# Patient Record
Sex: MALE | Race: WHITE | NOT HISPANIC OR LATINO
[De-identification: names, ages, dates, MRNs, and addresses within clinical notes are randomized per-mention and may not be internally consistent; named-entity substitution may affect disease eponyms.]

---

## 2017-06-06 ENCOUNTER — RECORD ABSTRACTING (OUTPATIENT)
Age: 77
End: 2017-06-06

## 2017-06-06 DIAGNOSIS — Z86.19 PERSONAL HISTORY OF OTHER INFECTIOUS AND PARASITIC DISEASES: ICD-10-CM

## 2017-06-06 DIAGNOSIS — Z82.3 FAMILY HISTORY OF STROKE: ICD-10-CM

## 2017-06-06 DIAGNOSIS — Z80.1 FAMILY HISTORY OF MALIGNANT NEOPLASM OF TRACHEA, BRONCHUS AND LUNG: ICD-10-CM

## 2017-06-06 DIAGNOSIS — Z82.49 FAMILY HISTORY OF ISCHEMIC HEART DISEASE AND OTHER DISEASES OF THE CIRCULATORY SYSTEM: ICD-10-CM

## 2017-06-06 DIAGNOSIS — Z92.3 PERSONAL HISTORY OF IRRADIATION: ICD-10-CM

## 2017-06-06 DIAGNOSIS — F15.90 OTHER STIMULANT USE, UNSPECIFIED, UNCOMPLICATED: ICD-10-CM

## 2017-06-06 DIAGNOSIS — Z87.09 PERSONAL HISTORY OF OTHER DISEASES OF THE RESPIRATORY SYSTEM: ICD-10-CM

## 2017-06-06 DIAGNOSIS — L03.90 CELLULITIS, UNSPECIFIED: ICD-10-CM

## 2017-06-06 DIAGNOSIS — J39.9 DISEASE OF UPPER RESPIRATORY TRACT, UNSPECIFIED: ICD-10-CM

## 2017-06-06 DIAGNOSIS — L82.1 OTHER SEBORRHEIC KERATOSIS: ICD-10-CM

## 2017-06-06 DIAGNOSIS — Z86.79 PERSONAL HISTORY OF OTHER DISEASES OF THE CIRCULATORY SYSTEM: ICD-10-CM

## 2017-06-06 DIAGNOSIS — Z87.891 PERSONAL HISTORY OF NICOTINE DEPENDENCE: ICD-10-CM

## 2017-07-24 ENCOUNTER — APPOINTMENT (OUTPATIENT)
Dept: HEART AND VASCULAR | Facility: CLINIC | Age: 77
End: 2017-07-24

## 2017-07-24 VITALS
TEMPERATURE: 97.6 F | HEART RATE: 54 BPM | OXYGEN SATURATION: 97 % | BODY MASS INDEX: 24.89 KG/M2 | DIASTOLIC BLOOD PRESSURE: 60 MMHG | WEIGHT: 147.58 LBS | SYSTOLIC BLOOD PRESSURE: 134 MMHG | HEIGHT: 64.57 IN

## 2017-07-24 DIAGNOSIS — Z12.9 ENCOUNTER FOR SCREENING FOR MALIGNANT NEOPLASM, SITE UNSPECIFIED: ICD-10-CM

## 2017-07-24 RX ORDER — ASPIRIN ENTERIC COATED TABLETS 81 MG 81 MG/1
81 TABLET, DELAYED RELEASE ORAL
Refills: 0 | Status: ACTIVE | COMMUNITY

## 2017-07-25 LAB
ALBUMIN SERPL ELPH-MCNC: 3.9 G/DL
ALP BLD-CCNC: 70 U/L
ALT SERPL-CCNC: 14 U/L
ANION GAP SERPL CALC-SCNC: 14 MMOL/L
AST SERPL-CCNC: 17 U/L
BASOPHILS # BLD AUTO: 0.03 K/UL
BASOPHILS NFR BLD AUTO: 0.5 %
BILIRUB SERPL-MCNC: 0.6 MG/DL
BUN SERPL-MCNC: 26 MG/DL
CALCIUM SERPL-MCNC: 9.2 MG/DL
CHLORIDE SERPL-SCNC: 104 MMOL/L
CHOLEST SERPL-MCNC: 119 MG/DL
CHOLEST/HDLC SERPL: 2.4 RATIO
CO2 SERPL-SCNC: 22 MMOL/L
CREAT SERPL-MCNC: 0.99 MG/DL
EOSINOPHIL # BLD AUTO: 0.29 K/UL
EOSINOPHIL NFR BLD AUTO: 5.2 %
GLUCOSE SERPL-MCNC: 93 MG/DL
HBA1C MFR BLD HPLC: 5.9 %
HCT VFR BLD CALC: 42.1 %
HDLC SERPL-MCNC: 49 MG/DL
HGB BLD-MCNC: 13.6 G/DL
IMM GRANULOCYTES NFR BLD AUTO: 0.4 %
LDLC SERPL CALC-MCNC: 54 MG/DL
LYMPHOCYTES # BLD AUTO: 1.38 K/UL
LYMPHOCYTES NFR BLD AUTO: 24.6 %
MAN DIFF?: NORMAL
MCHC RBC-ENTMCNC: 30.1 PG
MCHC RBC-ENTMCNC: 32.3 GM/DL
MCV RBC AUTO: 93.1 FL
MONOCYTES # BLD AUTO: 0.69 K/UL
MONOCYTES NFR BLD AUTO: 12.3 %
NEUTROPHILS # BLD AUTO: 3.2 K/UL
NEUTROPHILS NFR BLD AUTO: 57 %
PLATELET # BLD AUTO: 201 K/UL
POTASSIUM SERPL-SCNC: 4.7 MMOL/L
PROT SERPL-MCNC: 6.5 G/DL
RBC # BLD: 4.52 M/UL
RBC # FLD: 13.3 %
SODIUM SERPL-SCNC: 140 MMOL/L
TRIGL SERPL-MCNC: 78 MG/DL
WBC # FLD AUTO: 5.61 K/UL

## 2017-10-25 ENCOUNTER — RX RENEWAL (OUTPATIENT)
Age: 77
End: 2017-10-25

## 2017-12-07 ENCOUNTER — RX RENEWAL (OUTPATIENT)
Age: 77
End: 2017-12-07

## 2017-12-30 ENCOUNTER — RX RENEWAL (OUTPATIENT)
Age: 77
End: 2017-12-30

## 2018-01-23 ENCOUNTER — APPOINTMENT (OUTPATIENT)
Dept: HEART AND VASCULAR | Facility: CLINIC | Age: 78
End: 2018-01-23
Payer: MEDICARE

## 2018-01-23 VITALS
TEMPERATURE: 98 F | BODY MASS INDEX: 24.96 KG/M2 | HEART RATE: 59 BPM | SYSTOLIC BLOOD PRESSURE: 130 MMHG | HEIGHT: 64.57 IN | DIASTOLIC BLOOD PRESSURE: 70 MMHG | OXYGEN SATURATION: 96 % | WEIGHT: 148 LBS

## 2018-01-23 PROCEDURE — 99214 OFFICE O/P EST MOD 30 MIN: CPT | Mod: 25

## 2018-01-23 PROCEDURE — 93000 ELECTROCARDIOGRAM COMPLETE: CPT

## 2018-02-02 ENCOUNTER — RX RENEWAL (OUTPATIENT)
Age: 78
End: 2018-02-02

## 2018-07-24 ENCOUNTER — APPOINTMENT (OUTPATIENT)
Dept: HEART AND VASCULAR | Facility: CLINIC | Age: 78
End: 2018-07-24
Payer: MEDICARE

## 2018-07-24 VITALS
SYSTOLIC BLOOD PRESSURE: 120 MMHG | DIASTOLIC BLOOD PRESSURE: 60 MMHG | OXYGEN SATURATION: 98 % | TEMPERATURE: 97.6 F | WEIGHT: 149 LBS | HEART RATE: 61 BPM | BODY MASS INDEX: 25.13 KG/M2 | HEIGHT: 64.57 IN

## 2018-07-24 PROCEDURE — 99214 OFFICE O/P EST MOD 30 MIN: CPT | Mod: 25

## 2018-07-24 PROCEDURE — 36415 COLL VENOUS BLD VENIPUNCTURE: CPT

## 2018-07-24 RX ORDER — CLOPIDOGREL 75 MG/1
75 TABLET, FILM COATED ORAL DAILY
Refills: 0 | Status: DISCONTINUED | COMMUNITY
End: 2018-07-24

## 2018-07-26 LAB
ALBUMIN SERPL ELPH-MCNC: 4.3 G/DL
ALP BLD-CCNC: 78 U/L
ALT SERPL-CCNC: 11 U/L
ANION GAP SERPL CALC-SCNC: 10 MMOL/L
AST SERPL-CCNC: 15 U/L
BASOPHILS # BLD AUTO: 0.03 K/UL
BASOPHILS NFR BLD AUTO: 0.5 %
BILIRUB SERPL-MCNC: 0.6 MG/DL
BUN SERPL-MCNC: 25 MG/DL
CALCIUM SERPL-MCNC: 9.3 MG/DL
CHLORIDE SERPL-SCNC: 106 MMOL/L
CHOLEST SERPL-MCNC: 119 MG/DL
CHOLEST/HDLC SERPL: 2.6 RATIO
CO2 SERPL-SCNC: 27 MMOL/L
CREAT SERPL-MCNC: 1.08 MG/DL
EOSINOPHIL # BLD AUTO: 0.25 K/UL
EOSINOPHIL NFR BLD AUTO: 4.1 %
GLUCOSE SERPL-MCNC: 92 MG/DL
HBA1C MFR BLD HPLC: 6 %
HCT VFR BLD CALC: 43 %
HDLC SERPL-MCNC: 45 MG/DL
HGB BLD-MCNC: 13.2 G/DL
IMM GRANULOCYTES NFR BLD AUTO: 0.3 %
LDLC SERPL CALC-MCNC: 57 MG/DL
LYMPHOCYTES # BLD AUTO: 1.66 K/UL
LYMPHOCYTES NFR BLD AUTO: 27.4 %
MAN DIFF?: NORMAL
MCHC RBC-ENTMCNC: 29.4 PG
MCHC RBC-ENTMCNC: 30.7 GM/DL
MCV RBC AUTO: 95.8 FL
MONOCYTES # BLD AUTO: 0.62 K/UL
MONOCYTES NFR BLD AUTO: 10.2 %
NEUTROPHILS # BLD AUTO: 3.48 K/UL
NEUTROPHILS NFR BLD AUTO: 57.5 %
PLATELET # BLD AUTO: 217 K/UL
POTASSIUM SERPL-SCNC: 4.7 MMOL/L
PROT SERPL-MCNC: 6.6 G/DL
PSA SERPL-MCNC: <0.01 NG/ML
RBC # BLD: 4.49 M/UL
RBC # FLD: 13.2 %
SODIUM SERPL-SCNC: 143 MMOL/L
TRIGL SERPL-MCNC: 87 MG/DL
WBC # FLD AUTO: 6.06 K/UL

## 2018-09-17 ENCOUNTER — RX RENEWAL (OUTPATIENT)
Age: 78
End: 2018-09-17

## 2018-11-26 ENCOUNTER — RX RENEWAL (OUTPATIENT)
Age: 78
End: 2018-11-26

## 2019-01-22 ENCOUNTER — RX RENEWAL (OUTPATIENT)
Age: 79
End: 2019-01-22

## 2019-01-22 ENCOUNTER — APPOINTMENT (OUTPATIENT)
Dept: HEART AND VASCULAR | Facility: CLINIC | Age: 79
End: 2019-01-22
Payer: MEDICARE

## 2019-01-22 VITALS
WEIGHT: 145.99 LBS | DIASTOLIC BLOOD PRESSURE: 60 MMHG | HEART RATE: 75 BPM | HEIGHT: 64.57 IN | OXYGEN SATURATION: 96 % | SYSTOLIC BLOOD PRESSURE: 130 MMHG | BODY MASS INDEX: 24.62 KG/M2 | TEMPERATURE: 97.4 F

## 2019-01-22 PROCEDURE — 93000 ELECTROCARDIOGRAM COMPLETE: CPT

## 2019-01-22 PROCEDURE — 99214 OFFICE O/P EST MOD 30 MIN: CPT

## 2019-01-22 NOTE — PHYSICAL EXAM
[General Appearance - Well Developed] : well developed [Normal Appearance] : normal appearance [Well Groomed] : well groomed [General Appearance - Well Nourished] : well nourished [No Deformities] : no deformities [General Appearance - In No Acute Distress] : no acute distress [Normal Conjunctiva] : the conjunctiva exhibited no abnormalities [Eyelids - No Xanthelasma] : the eyelids demonstrated no xanthelasmas [Normal Oral Mucosa] : normal oral mucosa [No Oral Pallor] : no oral pallor [No Oral Cyanosis] : no oral cyanosis [Respiration, Rhythm And Depth] : normal respiratory rhythm and effort [Exaggerated Use Of Accessory Muscles For Inspiration] : no accessory muscle use [Auscultation Breath Sounds / Voice Sounds] : lungs were clear to auscultation bilaterally [Heart Rate And Rhythm] : heart rate and rhythm were normal [Heart Sounds] : normal S1 and S2 [Murmurs] : no murmurs present [Abdomen Soft] : soft [Abdomen Tenderness] : non-tender [Abdomen Mass (___ Cm)] : no abdominal mass palpated [Abnormal Walk] : normal gait [Gait - Sufficient For Exercise Testing] : the gait was sufficient for exercise testing [Nail Clubbing] : no clubbing of the fingernails [Cyanosis, Localized] : no localized cyanosis [Petechial Hemorrhages (___cm)] : no petechial hemorrhages [Skin Color & Pigmentation] : normal skin color and pigmentation [] : no rash [No Venous Stasis] : no venous stasis [No Skin Ulcers] : no skin ulcer [No Xanthoma] : no  xanthoma was observed [Oriented To Time, Place, And Person] : oriented to person, place, and time [Affect] : the affect was normal [Mood] : the mood was normal [No Anxiety] : not feeling anxious [FreeTextEntry1] : keratoses

## 2019-01-22 NOTE — REASON FOR VISIT
[Follow-Up - Clinic] : a clinic follow-up of [Coronary Artery Disease] : coronary artery disease [Hyperlipidemia] : hyperlipidemia [Hypertension] : hypertension [FreeTextEntry1] : s/p multiple stents, last one 11/3/16(pLAD) after presenting with CP and a very abnormal nuclear stress test.  Currently pain free. Walks a lot even in cold weather\par \par EKG: NSR, normal axis and intervals, no ST-Tw abnormalities. 1/22/19

## 2019-01-22 NOTE — HISTORY OF PRESENT ILLNESS
[FreeTextEntry1] : - Dr Quiroga in [place] retired, \par Derm- Dr Navin Muniz in [place]  [phone]

## 2019-01-22 NOTE — ASSESSMENT
[FreeTextEntry1] : ASHD- s/p multiple stents, pain free, walks 2-3 miles/day.  OK in cold weather\par \par HLD- Lipids at goal 11/2016,   rechecked and excellent 7/2018\par \par HTN- excellent\par \par Keratoses- handed a referrel to Ronald, lives in [place], he will see someone in [place]. Has not gone as of 1/23/18, finally he went to Dr Navin Muniz,had several skin cancers removed from face and scalp.\par \par h/o Prostate CA- needs f/u with Urology, Dr Quiroga retired,  Told to see a new Urologist, PSA sent. Zero\par \par Prediabetes- A1c 5.9, diet better

## 2019-02-04 ENCOUNTER — RX RENEWAL (OUTPATIENT)
Age: 79
End: 2019-02-04

## 2019-02-21 ENCOUNTER — RX RENEWAL (OUTPATIENT)
Age: 79
End: 2019-02-21

## 2019-07-22 ENCOUNTER — APPOINTMENT (OUTPATIENT)
Dept: HEART AND VASCULAR | Facility: CLINIC | Age: 79
End: 2019-07-22
Payer: MEDICARE

## 2019-07-22 VITALS
WEIGHT: 145 LBS | BODY MASS INDEX: 24.75 KG/M2 | SYSTOLIC BLOOD PRESSURE: 117 MMHG | HEART RATE: 75 BPM | TEMPERATURE: 98 F | DIASTOLIC BLOOD PRESSURE: 60 MMHG | HEIGHT: 64 IN | OXYGEN SATURATION: 98 %

## 2019-07-22 PROCEDURE — 99214 OFFICE O/P EST MOD 30 MIN: CPT

## 2019-07-22 PROCEDURE — 36415 COLL VENOUS BLD VENIPUNCTURE: CPT

## 2019-07-22 NOTE — PHYSICAL EXAM
[Normal Appearance] : normal appearance [General Appearance - Well Developed] : well developed [Well Groomed] : well groomed [General Appearance - Well Nourished] : well nourished [No Deformities] : no deformities [General Appearance - In No Acute Distress] : no acute distress [Normal Conjunctiva] : the conjunctiva exhibited no abnormalities [Eyelids - No Xanthelasma] : the eyelids demonstrated no xanthelasmas [Normal Oral Mucosa] : normal oral mucosa [No Oral Pallor] : no oral pallor [No Oral Cyanosis] : no oral cyanosis [Respiration, Rhythm And Depth] : normal respiratory rhythm and effort [Exaggerated Use Of Accessory Muscles For Inspiration] : no accessory muscle use [Auscultation Breath Sounds / Voice Sounds] : lungs were clear to auscultation bilaterally [Heart Rate And Rhythm] : heart rate and rhythm were normal [Heart Sounds] : normal S1 and S2 [Murmurs] : no murmurs present [Abdomen Soft] : soft [Abdomen Tenderness] : non-tender [Abnormal Walk] : normal gait [Abdomen Mass (___ Cm)] : no abdominal mass palpated [Gait - Sufficient For Exercise Testing] : the gait was sufficient for exercise testing [Nail Clubbing] : no clubbing of the fingernails [Cyanosis, Localized] : no localized cyanosis [Petechial Hemorrhages (___cm)] : no petechial hemorrhages [Skin Color & Pigmentation] : normal skin color and pigmentation [] : no rash [No Venous Stasis] : no venous stasis [No Skin Ulcers] : no skin ulcer [No Xanthoma] : no  xanthoma was observed [Oriented To Time, Place, And Person] : oriented to person, place, and time [Affect] : the affect was normal [Mood] : the mood was normal [No Anxiety] : not feeling anxious [FreeTextEntry1] : keratoses

## 2019-07-22 NOTE — HISTORY OF PRESENT ILLNESS
[FreeTextEntry1] : - Dr Quiroga in [place] retired, \par Derm- Dr Navin Muniz in [place]  [phone]

## 2019-07-22 NOTE — ASSESSMENT
[FreeTextEntry1] : ASHD- s/p multiple stents, pain free, walks 2-3 miles/day.  OK in cold weather\par \par HLD- Lipids at goal 11/2016,   rechecked and excellent 7/2018, repeated 7/2019\par \par HTN- excellent\par \par Keratoses- handed a referrel to Ronald, lives in [place], he will see someone in [place]. Has not gone as of 1/23/18, finally he went to Dr Navin Muniz,had several skin cancers removed from face and scalp.\par \par h/o Prostate CA- needs f/u with Urology, Dr Quiroga retired,  Told to see a new Urologist, PSA sent. Zero\par \par Prediabetes- A1c 5.9, diet better

## 2019-07-24 LAB
ALBUMIN SERPL ELPH-MCNC: 4.3 G/DL
ALP BLD-CCNC: 74 U/L
ALT SERPL-CCNC: 12 U/L
ANION GAP SERPL CALC-SCNC: 12 MMOL/L
AST SERPL-CCNC: 14 U/L
BASOPHILS # BLD AUTO: 0.05 K/UL
BASOPHILS NFR BLD AUTO: 0.7 %
BILIRUB SERPL-MCNC: 0.5 MG/DL
BUN SERPL-MCNC: 25 MG/DL
CALCIUM SERPL-MCNC: 9.5 MG/DL
CHLORIDE SERPL-SCNC: 109 MMOL/L
CHOLEST SERPL-MCNC: 124 MG/DL
CHOLEST/HDLC SERPL: 2.5 RATIO
CO2 SERPL-SCNC: 23 MMOL/L
CREAT SERPL-MCNC: 1.06 MG/DL
EOSINOPHIL # BLD AUTO: 0.27 K/UL
EOSINOPHIL NFR BLD AUTO: 3.8 %
ESTIMATED AVERAGE GLUCOSE: 120 MG/DL
GLUCOSE SERPL-MCNC: 106 MG/DL
HBA1C MFR BLD HPLC: 5.8 %
HCT VFR BLD CALC: 46.1 %
HDLC SERPL-MCNC: 49 MG/DL
HGB BLD-MCNC: 13.9 G/DL
IMM GRANULOCYTES NFR BLD AUTO: 0.6 %
LDLC SERPL CALC-MCNC: 56 MG/DL
LYMPHOCYTES # BLD AUTO: 1.39 K/UL
LYMPHOCYTES NFR BLD AUTO: 19.8 %
MAN DIFF?: NORMAL
MCHC RBC-ENTMCNC: 30.2 GM/DL
MCHC RBC-ENTMCNC: 30.2 PG
MCV RBC AUTO: 100.2 FL
MONOCYTES # BLD AUTO: 0.82 K/UL
MONOCYTES NFR BLD AUTO: 11.7 %
NEUTROPHILS # BLD AUTO: 4.46 K/UL
NEUTROPHILS NFR BLD AUTO: 63.4 %
PLATELET # BLD AUTO: 202 K/UL
POTASSIUM SERPL-SCNC: 4.8 MMOL/L
PROT SERPL-MCNC: 6.2 G/DL
RBC # BLD: 4.6 M/UL
RBC # FLD: 13 %
SODIUM SERPL-SCNC: 144 MMOL/L
TRIGL SERPL-MCNC: 97 MG/DL
WBC # FLD AUTO: 7.03 K/UL

## 2019-08-22 ENCOUNTER — RX RENEWAL (OUTPATIENT)
Age: 79
End: 2019-08-22

## 2019-11-06 ENCOUNTER — APPOINTMENT (OUTPATIENT)
Dept: HEART AND VASCULAR | Facility: CLINIC | Age: 79
End: 2019-11-06

## 2020-01-27 ENCOUNTER — APPOINTMENT (OUTPATIENT)
Dept: HEART AND VASCULAR | Facility: CLINIC | Age: 80
End: 2020-01-27
Payer: MEDICARE

## 2020-01-27 VITALS
WEIGHT: 145 LBS | OXYGEN SATURATION: 98 % | SYSTOLIC BLOOD PRESSURE: 118 MMHG | HEIGHT: 64 IN | DIASTOLIC BLOOD PRESSURE: 64 MMHG | HEART RATE: 76 BPM | BODY MASS INDEX: 24.75 KG/M2

## 2020-01-27 PROCEDURE — 99214 OFFICE O/P EST MOD 30 MIN: CPT

## 2020-01-27 PROCEDURE — 93000 ELECTROCARDIOGRAM COMPLETE: CPT

## 2020-01-27 NOTE — REASON FOR VISIT
[Follow-Up - Clinic] : a clinic follow-up of [Coronary Artery Disease] : coronary artery disease [Hyperlipidemia] : hyperlipidemia [Hypertension] : hypertension [FreeTextEntry1] : s/p multiple stents, last one 11/3/16(pLAD) after presenting with CP and a very abnormal nuclear stress test.  Currently pain free. Walks a lot even in cold weather\par 1/27/20  Dx with DJD of right hip and osteopenia\par EKG: NSR, normal axis and intervals, no ST-Tw abnormalities. 1/22/19

## 2020-01-27 NOTE — ASSESSMENT
[FreeTextEntry1] : ASHD- s/p multiple stents, pain free, walks 2-3 miles/day.  OK in cold weather, still doing well\par \par HLD- Lipids at goal 11/2016,   rechecked and excellent 7/2018, repeated 7/2019, excellent\par \par HTN- excellent\par \par Keratoses- handed a referrel to Derm, lives in [place], he will see someone in [place]. Has not gone as of 1/23/18, finally he went to Dr Navin Muniz,had several skin cancers removed from face and scalp.\par \par h/o Prostate CA- needs f/u with Urology, Dr Quiroga retired,  Told to see a new Urologist, PSA sent. Zero\par \par Prediabetes- A1c 5.9, diet better, 5.8

## 2020-02-14 ENCOUNTER — RX RENEWAL (OUTPATIENT)
Age: 80
End: 2020-02-14

## 2020-02-17 ENCOUNTER — RX RENEWAL (OUTPATIENT)
Age: 80
End: 2020-02-17

## 2020-06-12 ENCOUNTER — NON-APPOINTMENT (OUTPATIENT)
Age: 80
End: 2020-06-12

## 2020-06-12 ENCOUNTER — APPOINTMENT (OUTPATIENT)
Dept: HEART AND VASCULAR | Facility: CLINIC | Age: 80
End: 2020-06-12
Payer: MEDICARE

## 2020-06-12 VITALS
HEIGHT: 64 IN | BODY MASS INDEX: 22.7 KG/M2 | OXYGEN SATURATION: 96 % | SYSTOLIC BLOOD PRESSURE: 130 MMHG | HEART RATE: 76 BPM | DIASTOLIC BLOOD PRESSURE: 60 MMHG | WEIGHT: 132.98 LBS | TEMPERATURE: 98.5 F

## 2020-06-12 PROCEDURE — 93000 ELECTROCARDIOGRAM COMPLETE: CPT

## 2020-06-12 PROCEDURE — 99214 OFFICE O/P EST MOD 30 MIN: CPT

## 2020-06-12 RX ORDER — CLOPIDOGREL BISULFATE 75 MG/1
75 TABLET, FILM COATED ORAL
Qty: 90 | Refills: 3 | Status: DISCONTINUED | COMMUNITY
Start: 2017-12-07 | End: 2020-06-12

## 2020-06-12 NOTE — ASSESSMENT
[FreeTextEntry1] : ASHD- s/p multiple stents, pain free, walks 2-3 miles/day.  OK in cold weather, still doing well\par \par HLD- Lipids at goal 11/2016,   rechecked and excellent 7/2018, repeated 7/2019, excellent\par \par HTN- excellent, off Quinipril\par \par Hematuria- Admitted to [facility] 5/9/20 with Brachytherapy from 15 y/a.  Plavix and Quimipril DCed.  Has Ruiz Bag.\par \par Keratoses- handed a referrel to Derm, lives in [place], he will see someone in [place]. Has not gone as of 1/23/18, finally he went to Dr Navin Muniz,had several skin cancers removed from face and scalp.\par \par h/o Prostate CA- needs f/u with Urology, Dr Quiroga retired,  Told to see a new Urologist, PSA sent. Zero\par \par Prediabetes- A1c 5.9, diet better, 5.8

## 2020-06-12 NOTE — REVIEW OF SYSTEMS
[Negative] : Heme/Lymph [Recent Weight Loss (___ Lbs)] : recent [unfilled] ~Ulb weight loss [Hematuria] : hematuria

## 2020-06-12 NOTE — PHYSICAL EXAM
[General Appearance - Well Developed] : well developed [Normal Appearance] : normal appearance [General Appearance - Well Nourished] : well nourished [Well Groomed] : well groomed [No Deformities] : no deformities [General Appearance - In No Acute Distress] : no acute distress [Normal Conjunctiva] : the conjunctiva exhibited no abnormalities [Eyelids - No Xanthelasma] : the eyelids demonstrated no xanthelasmas [Heart Rate And Rhythm] : heart rate and rhythm were normal [Exaggerated Use Of Accessory Muscles For Inspiration] : no accessory muscle use [Respiration, Rhythm And Depth] : normal respiratory rhythm and effort [Auscultation Breath Sounds / Voice Sounds] : lungs were clear to auscultation bilaterally [Heart Sounds] : normal S1 and S2 [Murmurs] : no murmurs present [Abdomen Tenderness] : non-tender [Abdomen Soft] : soft [Abnormal Walk] : normal gait [Abdomen Mass (___ Cm)] : no abdominal mass palpated [Gait - Sufficient For Exercise Testing] : the gait was sufficient for exercise testing [Cyanosis, Localized] : no localized cyanosis [Nail Clubbing] : no clubbing of the fingernails [Petechial Hemorrhages (___cm)] : no petechial hemorrhages [Skin Color & Pigmentation] : normal skin color and pigmentation [] : no rash [No Venous Stasis] : no venous stasis [No Xanthoma] : no  xanthoma was observed [No Skin Ulcers] : no skin ulcer [Oriented To Time, Place, And Person] : oriented to person, place, and time [Affect] : the affect was normal [No Anxiety] : not feeling anxious [Mood] : the mood was normal [FreeTextEntry1] : keratoses

## 2020-06-12 NOTE — HISTORY OF PRESENT ILLNESS
[FreeTextEntry1] : - Dr Quiroga in [place] retired, \par Derm- Dr Navin Muniz in [place]  [phone]

## 2020-06-12 NOTE — REASON FOR VISIT
[Follow-Up - Clinic] : a clinic follow-up of [Hyperlipidemia] : hyperlipidemia [Coronary Artery Disease] : coronary artery disease [Hypertension] : hypertension [FreeTextEntry1] : s/p multiple stents, last one 11/3/16(pLAD) after presenting with CP and a very abnormal nuclear stress test.  Currently pain free. Walks a lot even in cold weather\par 1/27/20  Dx with DJD of right hip and osteopenia\par EKG: NSR, normal axis and intervals, no ST-Tw abnormalities. 1/22/19\par \par 6/12/20 Admitted to [facility] with Hematuria 2/T prior brachytherapy.  It  started May 9th.  Plavix stopped along with Quinipril.  Received a Tx

## 2020-07-14 ENCOUNTER — APPOINTMENT (OUTPATIENT)
Dept: HEART AND VASCULAR | Facility: CLINIC | Age: 80
End: 2020-07-14

## 2020-08-03 ENCOUNTER — APPOINTMENT (OUTPATIENT)
Dept: HEART AND VASCULAR | Facility: CLINIC | Age: 80
End: 2020-08-03
Payer: MEDICARE

## 2020-08-03 VITALS — HEART RATE: 72 BPM | SYSTOLIC BLOOD PRESSURE: 150 MMHG | DIASTOLIC BLOOD PRESSURE: 70 MMHG | RESPIRATION RATE: 14 BRPM

## 2020-08-03 VITALS — HEIGHT: 64 IN

## 2020-08-03 PROCEDURE — 99214 OFFICE O/P EST MOD 30 MIN: CPT

## 2020-08-03 NOTE — REASON FOR VISIT
[Follow-Up - Clinic] : a clinic follow-up of [Coronary Artery Disease] : coronary artery disease [Hyperlipidemia] : hyperlipidemia [Hypertension] : hypertension [FreeTextEntry1] : s/p multiple stents, last one 11/3/16(pLAD) after presenting with CP and a very abnormal nuclear stress test.  Currently pain free. Walks a lot even in cold weather\par 1/27/20  Dx with DJD of right hip and osteopenia\par EKG: NSR, normal axis and intervals, no ST-Tw abnormalities. 1/22/19\par \par 6/12/20 Admitted to [facility] with Hematuria 2/T prior brachytherapy.  It  started May 9th.  Plavix stopped along with Quinipril.  Received a Tx\par 8/3/20  No more blood but + urinary retention requiring 2 visits to [facility].  Ruiz in, developed LE edema.  Labs in June were good.

## 2020-08-03 NOTE — PHYSICAL EXAM
[General Appearance - Well Developed] : well developed [Normal Appearance] : normal appearance [Well Groomed] : well groomed [General Appearance - Well Nourished] : well nourished [No Deformities] : no deformities [General Appearance - In No Acute Distress] : no acute distress [Normal Conjunctiva] : the conjunctiva exhibited no abnormalities [Eyelids - No Xanthelasma] : the eyelids demonstrated no xanthelasmas [Respiration, Rhythm And Depth] : normal respiratory rhythm and effort [Exaggerated Use Of Accessory Muscles For Inspiration] : no accessory muscle use [Auscultation Breath Sounds / Voice Sounds] : lungs were clear to auscultation bilaterally [Heart Rate And Rhythm] : heart rate and rhythm were normal [Murmurs] : no murmurs present [Heart Sounds] : normal S1 and S2 [Abdomen Soft] : soft [Abdomen Mass (___ Cm)] : no abdominal mass palpated [Abdomen Tenderness] : non-tender [Gait - Sufficient For Exercise Testing] : the gait was sufficient for exercise testing [Abnormal Walk] : normal gait [Petechial Hemorrhages (___cm)] : no petechial hemorrhages [Nail Clubbing] : no clubbing of the fingernails [Cyanosis, Localized] : no localized cyanosis [Skin Color & Pigmentation] : normal skin color and pigmentation [No Venous Stasis] : no venous stasis [] : no rash [No Skin Ulcers] : no skin ulcer [No Xanthoma] : no  xanthoma was observed [Oriented To Time, Place, And Person] : oriented to person, place, and time [Affect] : the affect was normal [Mood] : the mood was normal [No Anxiety] : not feeling anxious [FreeTextEntry1] : 2+ edema

## 2020-08-03 NOTE — REVIEW OF SYSTEMS
[Recent Weight Loss (___ Lbs)] : recent [unfilled] ~Ulb weight loss [Hematuria] : hematuria [Negative] : Heme/Lymph

## 2020-08-03 NOTE — HISTORY OF PRESENT ILLNESS
[FreeTextEntry1] : - Dr Quiroga in [place] retired, \par Derm- Dr Navin Muniz in [place]  [phone]

## 2020-08-30 ENCOUNTER — RX RENEWAL (OUTPATIENT)
Age: 80
End: 2020-08-30

## 2020-12-11 ENCOUNTER — APPOINTMENT (OUTPATIENT)
Dept: HEART AND VASCULAR | Facility: CLINIC | Age: 80
End: 2020-12-11
Payer: MEDICARE

## 2020-12-11 VITALS
DIASTOLIC BLOOD PRESSURE: 60 MMHG | HEIGHT: 64 IN | BODY MASS INDEX: 23.73 KG/M2 | WEIGHT: 139 LBS | OXYGEN SATURATION: 96 % | TEMPERATURE: 98.5 F | SYSTOLIC BLOOD PRESSURE: 140 MMHG | HEART RATE: 60 BPM

## 2020-12-11 PROCEDURE — 99214 OFFICE O/P EST MOD 30 MIN: CPT

## 2020-12-11 RX ORDER — FUROSEMIDE 20 MG/1
20 TABLET ORAL
Qty: 42 | Refills: 3 | Status: DISCONTINUED | COMMUNITY
Start: 2020-08-03 | End: 2020-12-11

## 2020-12-11 RX ORDER — POTASSIUM CHLORIDE 750 MG/1
10 TABLET, FILM COATED, EXTENDED RELEASE ORAL
Qty: 42 | Refills: 1 | Status: DISCONTINUED | COMMUNITY
Start: 2020-08-03 | End: 2020-12-11

## 2020-12-11 RX ORDER — QUINAPRIL HYDROCHLORIDE 10 MG/1
10 TABLET, FILM COATED ORAL DAILY
Qty: 90 | Refills: 3 | Status: DISCONTINUED | COMMUNITY
Start: 2017-12-30 | End: 2020-12-11

## 2020-12-11 NOTE — REASON FOR VISIT
[Follow-Up - Clinic] : a clinic follow-up of [Coronary Artery Disease] : coronary artery disease [Hyperlipidemia] : hyperlipidemia [Hypertension] : hypertension [FreeTextEntry1] : s/p multiple stents, last one 11/3/16(pLAD) after presenting with CP and a very abnormal nuclear stress test.  Currently pain free. Walks a lot even in cold weather\par 1/27/20  Dx with DJD of right hip and osteopenia\par EKG: NSR, normal axis and intervals, no ST-Tw abnormalities. 1/22/19\par \par 6/12/20 Admitted to [facility] with Hematuria 2/T prior brachytherapy.  It  started May 9th.  Plavix stopped along with Quinipril.  Received a Tx\par 8/3/20  No more blood but + urinary retention requiring 2 visits to [facility].  Ruiz in, developed LE edema.  Labs in June were good.\par 12/11/20  Self catheterized for urination.  Had labs Sept at [place]

## 2020-12-11 NOTE — HISTORY OF PRESENT ILLNESS
[FreeTextEntry1] : - Dr Quiroga in [place] retired, \par Derm- Dr Navin Muniz in [place]  [phone]

## 2020-12-11 NOTE — ASSESSMENT
[FreeTextEntry1] : ASHD- s/p multiple stents, pain free, walks 2-3 miles/day.  OK in cold weather, still doing well but walking less\par \par LE edema- recent labs at  were good, BP is up off quinapril and there is moderate edema, will start Lasix 20 mg and K 10 mEq MWF.  Edema resolved, off Q, lasix and K.\par \par HLD- Lipids at goal 11/2016,   rechecked and excellent 7/2018, repeated 7/2019, excellent\par \par HTN- was excellent, off Quinipril, now up and having LE edema\par \par Hematuria- Admitted to [facility] 5/9/20 with Brachytherapy from 15 y/a.  Plavix and Quinipril DCed.  Has Ruiz Bag.\par \par Keratoses- handed a referrel to Derm, lives in [place], he will see someone in [place]. Has not gone as of 1/23/18, finally he went to Dr Navin Muniz, had several skin cancers removed from face and scalp.\par \par h/o Prostate CA- needs f/u with Urology, Dr Quiroga retired,  Told to see a new Urologist, PSA sent. Zero.  Followed at [facility].\par \par Prediabetes- A1c 5.9, diet better, 5.8.  Need recent labs

## 2020-12-11 NOTE — PHYSICAL EXAM
[General Appearance - Well Developed] : well developed [Normal Appearance] : normal appearance [Well Groomed] : well groomed [General Appearance - Well Nourished] : well nourished [No Deformities] : no deformities [General Appearance - In No Acute Distress] : no acute distress [Normal Conjunctiva] : the conjunctiva exhibited no abnormalities [Eyelids - No Xanthelasma] : the eyelids demonstrated no xanthelasmas [Respiration, Rhythm And Depth] : normal respiratory rhythm and effort [Exaggerated Use Of Accessory Muscles For Inspiration] : no accessory muscle use [Auscultation Breath Sounds / Voice Sounds] : lungs were clear to auscultation bilaterally [Heart Rate And Rhythm] : heart rate and rhythm were normal [Heart Sounds] : normal S1 and S2 [Murmurs] : no murmurs present [Abdomen Soft] : soft [Abdomen Tenderness] : non-tender [Abdomen Mass (___ Cm)] : no abdominal mass palpated [Abnormal Walk] : normal gait [Gait - Sufficient For Exercise Testing] : the gait was sufficient for exercise testing [Nail Clubbing] : no clubbing of the fingernails [Cyanosis, Localized] : no localized cyanosis [Petechial Hemorrhages (___cm)] : no petechial hemorrhages [Skin Color & Pigmentation] : normal skin color and pigmentation [] : no rash [No Venous Stasis] : no venous stasis [No Skin Ulcers] : no skin ulcer [No Xanthoma] : no  xanthoma was observed [Oriented To Time, Place, And Person] : oriented to person, place, and time [Affect] : the affect was normal [Mood] : the mood was normal [No Anxiety] : not feeling anxious [FreeTextEntry1] : keratoses

## 2021-02-08 ENCOUNTER — RX RENEWAL (OUTPATIENT)
Age: 81
End: 2021-02-08

## 2021-03-04 ENCOUNTER — NON-APPOINTMENT (OUTPATIENT)
Age: 81
End: 2021-03-04

## 2021-03-04 ENCOUNTER — APPOINTMENT (OUTPATIENT)
Dept: HEART AND VASCULAR | Facility: CLINIC | Age: 81
End: 2021-03-04
Payer: MEDICARE

## 2021-03-04 VITALS
HEIGHT: 64 IN | OXYGEN SATURATION: 93 % | WEIGHT: 139 LBS | TEMPERATURE: 98 F | SYSTOLIC BLOOD PRESSURE: 155 MMHG | DIASTOLIC BLOOD PRESSURE: 75 MMHG | BODY MASS INDEX: 23.73 KG/M2 | HEART RATE: 75 BPM

## 2021-03-04 DIAGNOSIS — R60.0 LOCALIZED EDEMA: ICD-10-CM

## 2021-03-04 DIAGNOSIS — K40.90 UNILATERAL INGUINAL HERNIA, W/OUT OBSTRUCTION OR GANGRENE, NOT SPECIFIED AS RECURRENT: ICD-10-CM

## 2021-03-04 PROCEDURE — 99214 OFFICE O/P EST MOD 30 MIN: CPT

## 2021-03-04 PROCEDURE — 93000 ELECTROCARDIOGRAM COMPLETE: CPT

## 2021-03-04 NOTE — REVIEW OF SYSTEMS
[Recent Weight Loss (___ Lbs)] : recent [unfilled] ~Ulb weight loss [Hematuria] : hematuria [Negative] : Heme/Lymph [Abdominal Swelling] : abdominal swelling

## 2021-03-04 NOTE — REASON FOR VISIT
[Follow-Up - Clinic] : a clinic follow-up of [Coronary Artery Disease] : coronary artery disease [Hyperlipidemia] : hyperlipidemia [Hypertension] : hypertension [FreeTextEntry1] : s/p multiple stents, last one 11/3/16(pLAD) after presenting with CP and a very abnormal nuclear stress test.  Currently pain free. Walks a lot even in cold weather\par 1/27/20  Dx with DJD of right hip and osteopenia\par 6/12/20 Admitted to [facility] with Hematuria 2/T prior brachytherapy.  It  started May 9th.  Plavix stopped along with Quinipril.  Received a Tx\par 8/3/20  No more blood but + urinary retention requiring 2 visits to [facility].  Ruiz in, developed LE edema.  Labs in June were good.\par 12/11/20  Self catheterized for urination.  Had labs Sept at [place]\par 3/4/21 Got first Covid Vaccine, has a new right inguinal bulge present x 1 week according to the pt.  Not painful\par \par EKG: NSR, frequent APCs,  normal axis and intervals, no ST-Tw abnormalities. 3/4/21

## 2021-03-04 NOTE — ASSESSMENT
[FreeTextEntry1] : ASHD- s/p multiple stents, pain free, walks 2-3 miles/day.  OK in cold weather, still doing well but walking less\par \par LE edema- recent labs at  were good, BP is up off quinapril and there is moderate edema, will start Lasix 20 mg and K 10 mEq MWF.  Edema resolved, off Q, lasix and K. 1+ edema is back\par \par HLD- Lipids at goal 11/2016,   rechecked and excellent 7/2018, repeated 7/2019, excellent.  Now getting labs at \par \par HTN- was excellent, off Quinipril, now up and having LE edema.  Will consider diuretic if it stays up.\par \par Large right inguinal hernia- referred to surgery, I can not reduce it as it just pops right back out.\par \par Hematuria- Admitted to [facility] 5/9/20 with Brachytherapy from 15 y/a.  Plavix and Quinipril DCed.  HadFoley Bag.\par \par Keratoses- handed a referrel to Derm, lives in [place], he will see someone in [place]. Has not gone as of 1/23/18, finally he went to Dr Navin Muniz, had several skin cancers removed from face and scalp.\par \par h/o Prostate CA- needs f/u with Urology, Dr Quiroga retired,  Told to see a new Urologist, PSA sent. Zero.  Followed at [facility].\par \par Prediabetes- A1c 5.9, diet better, 5.8.  Need recent labs

## 2021-03-04 NOTE — HISTORY OF PRESENT ILLNESS
[FreeTextEntry1] : - Dr Quiroga in [place] retired,  [place]\par Derm- Dr Navin Muniz in [place]  [phone]

## 2021-03-24 ENCOUNTER — APPOINTMENT (OUTPATIENT)
Dept: UROLOGY | Facility: CLINIC | Age: 81
End: 2021-03-24
Payer: MEDICARE

## 2021-03-24 VITALS — HEART RATE: 69 BPM | TEMPERATURE: 97.9 F | SYSTOLIC BLOOD PRESSURE: 172 MMHG | DIASTOLIC BLOOD PRESSURE: 77 MMHG

## 2021-03-24 DIAGNOSIS — Z87.448 PERSONAL HISTORY OF OTHER DISEASES OF URINARY SYSTEM: ICD-10-CM

## 2021-03-24 PROCEDURE — 99204 OFFICE O/P NEW MOD 45 MIN: CPT

## 2021-03-25 PROBLEM — Z87.448 HISTORY OF GROSS HEMATURIA: Status: ACTIVE | Noted: 2021-03-25

## 2021-03-25 NOTE — HISTORY OF PRESENT ILLNESS
[Urinary Incontinence] : urinary incontinence [Urinary Retention] : urinary retention [Straining] : straining [Weak Stream] : weak stream [FreeTextEntry1] : This is an 80 year old male with history of prostate cancer in 2005, treated with seeds who developed gross hematuria in May 2020. 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  His blood thinners were stopped after patient continued to have  6-8 weeks of intermittent hematuria.\par Followed up with the urology clinic at [facility] and had multiple failed voiding trials.  Eventually cystoscopy was performed in September 2020, patient does not report any abnormal findings. \par He was told after the cysto that he would need to perform CIC.  He currently catheterizes with a 14 Fr coude catheter 2 x day.\par He does keep a voiding diary.  In the morning he reports an urge to urinate but cant and needs to catheterize.  He records volumes between 400-500 cc each time he catheterizes.  After the initial CIC he is able to voiding through out the day and records voided volumes of ~150 cc.\par Currently on Finasteride and FLomax\par \par Denies any hematuria or dysuria\par \par Dr. John Villatoro will be doing hernia repair in the coming future.\par \par Surgical Hx: cardiac stents x 5\par Social Hx: former smoker ( 40 year ppd smoking history), no ETOH, retired \par Family Hx: sister with lung Cancer\par  [Urinary Urgency] : no urinary urgency [Urinary Frequency] : no urinary frequency [Nocturia] : no nocturia [Intermittency] : no intermittency [Dysuria] : no dysuria [Hematuria - Gross] : no gross hematuria [Weight Loss] : no recent ~M [unfilled] weight loss [Fever] : no fever

## 2021-03-25 NOTE — PHYSICAL EXAM
[General Appearance - Well Developed] : well developed [General Appearance - Well Nourished] : well nourished [Normal Appearance] : normal appearance [Well Groomed] : well groomed [General Appearance - In No Acute Distress] : no acute distress [Edema] : no peripheral edema [Respiration, Rhythm And Depth] : normal respiratory rhythm and effort [Exaggerated Use Of Accessory Muscles For Inspiration] : no accessory muscle use [Abdomen Soft] : soft [Abdomen Tenderness] : non-tender [Costovertebral Angle Tenderness] : no ~M costovertebral angle tenderness [Urethral Meatus] : meatus normal [Penis Abnormality] : normal circumcised penis [Urinary Bladder Findings] : the bladder was normal on palpation [Scrotum] : the scrotum was normal [Testes Tenderness] : no tenderness of the testes [Testes Mass (___cm)] : there were no testicular masses [Normal Station and Gait] : the gait and station were normal for the patient's age [] : no rash [No Focal Deficits] : no focal deficits [Oriented To Time, Place, And Person] : oriented to person, place, and time [Affect] : the affect was normal [Mood] : the mood was normal [Not Anxious] : not anxious [No Palpable Adenopathy] : no palpable adenopathy [FreeTextEntry1] : right inguinal hernia, reducable

## 2021-03-25 NOTE — ASSESSMENT
[FreeTextEntry1] : 80 year old male with history of radiation cystitis now requiring CIC BID\par -no urological intervention needed before hernia surgery and no urological contraindications to having hernia repair.\par -Would recommend patient continue to perform CIC and keep diary\par -continue with dual therapy\par -Follow up with Dr. Sadler for UDS after hernia repair\par \par \par RTC after UDS to discuss next steps

## 2021-03-25 NOTE — END OF VISIT
[FreeTextEntry3] : 81yo male with h/o radiation for prostate cancer, recently developed urinary retention which seems to be episodic, needs to catheterize twice daily but at other times voids with normal residuals. Recommend UDS for further eval of bladder function/outlet obstruction. OK to proceed with hernia surgery.

## 2021-04-01 ENCOUNTER — LABORATORY RESULT (OUTPATIENT)
Age: 81
End: 2021-04-01

## 2021-04-01 ENCOUNTER — APPOINTMENT (OUTPATIENT)
Dept: HEART AND VASCULAR | Facility: CLINIC | Age: 81
End: 2021-04-01
Payer: MEDICARE

## 2021-04-01 ENCOUNTER — NON-APPOINTMENT (OUTPATIENT)
Age: 81
End: 2021-04-01

## 2021-04-01 VITALS
WEIGHT: 134.99 LBS | DIASTOLIC BLOOD PRESSURE: 70 MMHG | SYSTOLIC BLOOD PRESSURE: 146 MMHG | OXYGEN SATURATION: 93 % | HEIGHT: 64 IN | HEART RATE: 76 BPM | BODY MASS INDEX: 23.05 KG/M2 | TEMPERATURE: 97.9 F

## 2021-04-01 DIAGNOSIS — Z01.810 ENCOUNTER FOR PREPROCEDURAL CARDIOVASCULAR EXAMINATION: ICD-10-CM

## 2021-04-01 PROCEDURE — 93000 ELECTROCARDIOGRAM COMPLETE: CPT

## 2021-04-01 PROCEDURE — 99214 OFFICE O/P EST MOD 30 MIN: CPT

## 2021-04-01 PROCEDURE — 36415 COLL VENOUS BLD VENIPUNCTURE: CPT

## 2021-04-01 NOTE — HISTORY OF PRESENT ILLNESS
[Preoperative Visit] : for a medical evaluation prior to surgery [Scheduled Procedure ___] : a [unfilled] [Date of Surgery ___] : on [unfilled] [Surgeon Name ___] : surgeon: [unfilled] [de-identified] : Tel: [phone], Fax: [phone] [FreeTextEntry1] : EKG: NSR, APCs and VPCs, normal axis and intervals, no ST-Tw abnormalities.  4/1/21

## 2021-04-01 NOTE — PHYSICAL EXAM
[General Appearance - Well Developed] : well developed [Normal Appearance] : normal appearance [Well Groomed] : well groomed [General Appearance - Well Nourished] : well nourished [No Deformities] : no deformities [General Appearance - In No Acute Distress] : no acute distress [Respiration, Rhythm And Depth] : normal respiratory rhythm and effort [Exaggerated Use Of Accessory Muscles For Inspiration] : no accessory muscle use [Auscultation Breath Sounds / Voice Sounds] : lungs were clear to auscultation bilaterally [Heart Rate And Rhythm] : heart rate and rhythm were normal [Heart Sounds] : normal S1 and S2 [Murmurs] : no murmurs present [Abdomen Soft] : soft [Abdomen Tenderness] : non-tender [Abdomen Mass (___ Cm)] : no abdominal mass palpated [Abnormal Walk] : normal gait [Gait - Sufficient For Exercise Testing] : the gait was sufficient for exercise testing [Nail Clubbing] : no clubbing of the fingernails [Cyanosis, Localized] : no localized cyanosis [Petechial Hemorrhages (___cm)] : no petechial hemorrhages [Skin Color & Pigmentation] : normal skin color and pigmentation [] : no rash [No Venous Stasis] : no venous stasis [No Skin Ulcers] : no skin ulcer [No Xanthoma] : no  xanthoma was observed [FreeTextEntry1] : keratoses

## 2021-04-01 NOTE — REVIEW OF SYSTEMS
[Negative] : Heme/Lymph Subsequent Stages Histo Method Verbiage: Using a similar technique to that described above, a thin layer of tissue was removed from all areas where tumor was visible on the previous stage.  The tissue was again oriented, mapped, dyed, and processed as above.

## 2021-04-01 NOTE — ASSESSMENT
[FreeTextEntry1] : Preop- Labs were drawn today in Office.  Pt cleared\par \par ASHD- s/p multiple stents, pain free, walks 2-3 miles/day. OK in cold weather, still doing well but walking less\par \par LE edema- recent labs at  were good, BP is up off quinapril and there is moderate edema, will start Lasix 20 mg and K 10 mEq MWF. Edema resolved, off Q, lasix and K.  edema is better\par \par HLD- Lipids at goal 11/2016, rechecked and excellent 7/2018, repeated 7/2019, excellent. Now getting labs at \par \par HTN- was excellent, off Quinipril, now up and having LE edema. Will consider diuretic if it stays up.\par \par Large right inguinal hernia- referred to surgery, I can not reduce it as it just pops right back out.\par \par Hematuria- Admitted to [facility] 5/9/20 with Brachytherapy from 15 y/a. Plavix and Quinipril DCed. Had Ruiz Bag.\par \par Keratoses- handed a referrel to Derm, lives in [place], he will see someone in [place]. Has not gone as of 1/23/18, finally he went to Dr Navin Muniz, had several skin cancers removed from face and scalp.\par \par h/o Prostate CA- needs f/u with Urology, Dr Quiroga retired, Told to see a new Urologist, PSA sent. Zero. Followed at [facility]. Hematuria- Admitted to [facility] 5/9/20. h/o Brachytherapy from 15 y/a. Plavix and Quinipril DCed. Had Ruiz Bag. Saw Dr Preciado \par \par Prediabetes- A1c 5.9, diet better, 5.8. Need recent labs.

## 2021-04-02 LAB
ALBUMIN SERPL ELPH-MCNC: 4.4 G/DL
ALP BLD-CCNC: 82 U/L
ALT SERPL-CCNC: 10 U/L
ANION GAP SERPL CALC-SCNC: 13 MMOL/L
APPEARANCE: ABNORMAL
APTT BLD: 33.9 SEC
AST SERPL-CCNC: 12 U/L
BASOPHILS # BLD AUTO: 0.06 K/UL
BASOPHILS NFR BLD AUTO: 0.7 %
BILIRUB SERPL-MCNC: 0.5 MG/DL
BILIRUBIN URINE: NEGATIVE
BLOOD URINE: ABNORMAL
BUN SERPL-MCNC: 29 MG/DL
CALCIUM SERPL-MCNC: 9.9 MG/DL
CHLORIDE SERPL-SCNC: 108 MMOL/L
CHOLEST SERPL-MCNC: 140 MG/DL
CO2 SERPL-SCNC: 24 MMOL/L
COLOR: YELLOW
CREAT SERPL-MCNC: 1.12 MG/DL
EOSINOPHIL # BLD AUTO: 0.08 K/UL
EOSINOPHIL NFR BLD AUTO: 0.9 %
GLUCOSE QUALITATIVE U: NEGATIVE
GLUCOSE SERPL-MCNC: 121 MG/DL
HCT VFR BLD CALC: 45.8 %
HDLC SERPL-MCNC: 60 MG/DL
HGB BLD-MCNC: 13.9 G/DL
IMM GRANULOCYTES NFR BLD AUTO: 0.6 %
INR PPP: 1.04 RATIO
KETONES URINE: NEGATIVE
LDLC SERPL CALC-MCNC: 63 MG/DL
LEUKOCYTE ESTERASE URINE: ABNORMAL
LYMPHOCYTES # BLD AUTO: 0.97 K/UL
LYMPHOCYTES NFR BLD AUTO: 10.8 %
MAN DIFF?: NORMAL
MCHC RBC-ENTMCNC: 30.3 GM/DL
MCHC RBC-ENTMCNC: 30.4 PG
MCV RBC AUTO: 100.2 FL
MONOCYTES # BLD AUTO: 0.82 K/UL
MONOCYTES NFR BLD AUTO: 9.1 %
NEUTROPHILS # BLD AUTO: 7.03 K/UL
NEUTROPHILS NFR BLD AUTO: 77.9 %
NITRITE URINE: NEGATIVE
NONHDLC SERPL-MCNC: 81 MG/DL
PH URINE: 5.5
PLATELET # BLD AUTO: 251 K/UL
POTASSIUM SERPL-SCNC: 5.2 MMOL/L
PROT SERPL-MCNC: 6.5 G/DL
PROTEIN URINE: ABNORMAL
PT BLD: 12.3 SEC
RBC # BLD: 4.57 M/UL
RBC # FLD: 13.4 %
SODIUM SERPL-SCNC: 144 MMOL/L
SPECIFIC GRAVITY URINE: 1.03
TRIGL SERPL-MCNC: 91 MG/DL
UROBILINOGEN URINE: ABNORMAL
WBC # FLD AUTO: 9.01 K/UL

## 2021-04-08 DIAGNOSIS — Z01.818 ENCOUNTER FOR OTHER PREPROCEDURAL EXAMINATION: ICD-10-CM

## 2021-04-09 ENCOUNTER — APPOINTMENT (OUTPATIENT)
Dept: DISASTER EMERGENCY | Facility: CLINIC | Age: 81
End: 2021-04-09

## 2021-04-09 RX ORDER — TAMSULOSIN HYDROCHLORIDE 0.4 MG/1
1 CAPSULE ORAL
Qty: 0 | Refills: 0 | DISCHARGE

## 2021-04-10 LAB — SARS-COV-2 N GENE NPH QL NAA+PROBE: NOT DETECTED

## 2021-04-11 ENCOUNTER — TRANSCRIPTION ENCOUNTER (OUTPATIENT)
Age: 81
End: 2021-04-11

## 2021-04-12 ENCOUNTER — OUTPATIENT (OUTPATIENT)
Dept: INPATIENT UNIT | Facility: HOSPITAL | Age: 81
LOS: 1 days | Discharge: HOME | End: 2021-04-12
Payer: MEDICARE

## 2021-04-12 VITALS
DIASTOLIC BLOOD PRESSURE: 76 MMHG | TEMPERATURE: 98 F | WEIGHT: 138.01 LBS | OXYGEN SATURATION: 96 % | RESPIRATION RATE: 16 BRPM | HEIGHT: 66 IN | HEART RATE: 75 BPM | SYSTOLIC BLOOD PRESSURE: 157 MMHG

## 2021-04-12 DIAGNOSIS — Z41.9 ENCOUNTER FOR PROCEDURE FOR PURPOSES OTHER THAN REMEDYING HEALTH STATE, UNSPECIFIED: Chronic | ICD-10-CM

## 2021-04-12 DIAGNOSIS — H26.9 UNSPECIFIED CATARACT: Chronic | ICD-10-CM

## 2021-04-12 DIAGNOSIS — Z90.49 ACQUIRED ABSENCE OF OTHER SPECIFIED PARTS OF DIGESTIVE TRACT: Chronic | ICD-10-CM

## 2021-04-12 DIAGNOSIS — Z90.89 ACQUIRED ABSENCE OF OTHER ORGANS: Chronic | ICD-10-CM

## 2021-04-12 DIAGNOSIS — N42.9 DISORDER OF PROSTATE, UNSPECIFIED: Chronic | ICD-10-CM

## 2021-04-12 LAB
ANION GAP SERPL CALC-SCNC: 7 MMOL/L — SIGNIFICANT CHANGE UP (ref 5–17)
BUN SERPL-MCNC: 27 MG/DL — HIGH (ref 7–23)
CALCIUM SERPL-MCNC: 8.8 MG/DL — SIGNIFICANT CHANGE UP (ref 8.4–10.5)
CHLORIDE SERPL-SCNC: 106 MMOL/L — SIGNIFICANT CHANGE UP (ref 96–108)
CO2 SERPL-SCNC: 26 MMOL/L — SIGNIFICANT CHANGE UP (ref 22–31)
CREAT SERPL-MCNC: 1.03 MG/DL — SIGNIFICANT CHANGE UP (ref 0.5–1.3)
GLUCOSE BLDC GLUCOMTR-MCNC: 109 MG/DL — HIGH (ref 70–99)
GLUCOSE SERPL-MCNC: 123 MG/DL — HIGH (ref 70–99)
HCT VFR BLD CALC: 39.1 % — SIGNIFICANT CHANGE UP (ref 39–50)
HGB BLD-MCNC: 12.4 G/DL — LOW (ref 13–17)
MAGNESIUM SERPL-MCNC: 1.9 MG/DL — SIGNIFICANT CHANGE UP (ref 1.6–2.6)
MCHC RBC-ENTMCNC: 30.4 PG — SIGNIFICANT CHANGE UP (ref 27–34)
MCHC RBC-ENTMCNC: 31.7 GM/DL — LOW (ref 32–36)
MCV RBC AUTO: 95.8 FL — SIGNIFICANT CHANGE UP (ref 80–100)
NRBC # BLD: 0 /100 WBCS — SIGNIFICANT CHANGE UP (ref 0–0)
PHOSPHATE SERPL-MCNC: 3.9 MG/DL — SIGNIFICANT CHANGE UP (ref 2.5–4.5)
PLATELET # BLD AUTO: 164 K/UL — SIGNIFICANT CHANGE UP (ref 150–400)
POTASSIUM SERPL-MCNC: 5 MMOL/L — SIGNIFICANT CHANGE UP (ref 3.5–5.3)
POTASSIUM SERPL-SCNC: 5 MMOL/L — SIGNIFICANT CHANGE UP (ref 3.5–5.3)
RBC # BLD: 4.08 M/UL — LOW (ref 4.2–5.8)
RBC # FLD: 13.1 % — SIGNIFICANT CHANGE UP (ref 10.3–14.5)
SODIUM SERPL-SCNC: 139 MMOL/L — SIGNIFICANT CHANGE UP (ref 135–145)
TROPONIN T SERPL-MCNC: <0.01 NG/ML — SIGNIFICANT CHANGE UP (ref 0–0.01)
WBC # BLD: 10.69 K/UL — HIGH (ref 3.8–10.5)
WBC # FLD AUTO: 10.69 K/UL — HIGH (ref 3.8–10.5)

## 2021-04-12 PROCEDURE — 71045 X-RAY EXAM CHEST 1 VIEW: CPT | Mod: 26

## 2021-04-12 PROCEDURE — 51703 INSERT BLADDER CATH COMPLEX: CPT

## 2021-04-12 RX ORDER — OXYCODONE HYDROCHLORIDE 5 MG/1
5 TABLET ORAL EVERY 6 HOURS
Refills: 0 | Status: DISCONTINUED | OUTPATIENT
Start: 2021-04-12 | End: 2021-04-13

## 2021-04-12 RX ORDER — FINASTERIDE 5 MG/1
1 TABLET, FILM COATED ORAL
Qty: 0 | Refills: 0 | DISCHARGE

## 2021-04-12 RX ORDER — ASPIRIN/CALCIUM CARB/MAGNESIUM 324 MG
81 TABLET ORAL DAILY
Refills: 0 | Status: DISCONTINUED | OUTPATIENT
Start: 2021-04-13 | End: 2021-04-13

## 2021-04-12 RX ORDER — HEPARIN SODIUM 5000 [USP'U]/ML
5000 INJECTION INTRAVENOUS; SUBCUTANEOUS EVERY 8 HOURS
Refills: 0 | Status: DISCONTINUED | OUTPATIENT
Start: 2021-04-12 | End: 2021-04-13

## 2021-04-12 RX ORDER — HYDROMORPHONE HYDROCHLORIDE 2 MG/ML
0.5 INJECTION INTRAMUSCULAR; INTRAVENOUS; SUBCUTANEOUS ONCE
Refills: 0 | Status: DISCONTINUED | OUTPATIENT
Start: 2021-04-12 | End: 2021-04-12

## 2021-04-12 RX ORDER — METOPROLOL TARTRATE 50 MG
50 TABLET ORAL DAILY
Refills: 0 | Status: DISCONTINUED | OUTPATIENT
Start: 2021-04-13 | End: 2021-04-13

## 2021-04-12 RX ORDER — BUPIVACAINE 13.3 MG/ML
20 INJECTION, SUSPENSION, LIPOSOMAL INFILTRATION ONCE
Refills: 0 | Status: DISCONTINUED | OUTPATIENT
Start: 2021-04-12 | End: 2021-04-13

## 2021-04-12 RX ORDER — ACETAMINOPHEN 500 MG
650 TABLET ORAL EVERY 6 HOURS
Refills: 0 | Status: DISCONTINUED | OUTPATIENT
Start: 2021-04-12 | End: 2021-04-13

## 2021-04-12 RX ORDER — SODIUM CHLORIDE 9 MG/ML
1000 INJECTION, SOLUTION INTRAVENOUS
Refills: 0 | Status: DISCONTINUED | OUTPATIENT
Start: 2021-04-12 | End: 2021-04-13

## 2021-04-12 RX ORDER — TAMSULOSIN HYDROCHLORIDE 0.4 MG/1
1 CAPSULE ORAL
Qty: 0 | Refills: 0 | DISCHARGE

## 2021-04-12 RX ORDER — TAMSULOSIN HYDROCHLORIDE 0.4 MG/1
0.4 CAPSULE ORAL AT BEDTIME
Refills: 0 | Status: DISCONTINUED | OUTPATIENT
Start: 2021-04-12 | End: 2021-04-13

## 2021-04-12 RX ADMIN — HYDROMORPHONE HYDROCHLORIDE 0.5 MILLIGRAM(S): 2 INJECTION INTRAMUSCULAR; INTRAVENOUS; SUBCUTANEOUS at 19:03

## 2021-04-12 RX ADMIN — HEPARIN SODIUM 5000 UNIT(S): 5000 INJECTION INTRAVENOUS; SUBCUTANEOUS at 22:03

## 2021-04-12 RX ADMIN — Medication 650 MILLIGRAM(S): at 22:03

## 2021-04-12 RX ADMIN — TAMSULOSIN HYDROCHLORIDE 0.4 MILLIGRAM(S): 0.4 CAPSULE ORAL at 22:03

## 2021-04-12 RX ADMIN — Medication 650 MILLIGRAM(S): at 23:00

## 2021-04-12 RX ADMIN — HYDROMORPHONE HYDROCHLORIDE 0.5 MILLIGRAM(S): 2 INJECTION INTRAMUSCULAR; INTRAVENOUS; SUBCUTANEOUS at 20:04

## 2021-04-12 RX ADMIN — SODIUM CHLORIDE 60 MILLILITER(S): 9 INJECTION, SOLUTION INTRAVENOUS at 18:39

## 2021-04-12 NOTE — BRIEF OPERATIVE NOTE - OPERATION/FINDINGS
Complex insertion of Ruiz requiring intraoperative urology consult and placement of 14Fr coude catheter. Patient prepped and draped in sterile fashion. Abdomen entered via supraumbilical incision and transection of umbilical stalk. Abdomen inspected. Large right sided direct and small right sided indirect inguinal hernias identified. Small left sided inguinal hernia identified. Parietal peritoneum divided and pre-peritoneal space dissected. 15x10 Progrip mesh placed. Peritoneum reapproximated using hernia sac and zx 2-0 Quill sutures. Hemostasis confirmed. Fascia closed with Maxon. Skin closed with Monocryl and Dermabond.

## 2021-04-12 NOTE — PRE-OP CHECKLIST - BP NONINVASIVE DIASTOLIC (MM HG)
July 28, 2020    Patient requesting follow-up appointment with Dr. Handley.     Patient was previously established with Dr. Willis. LOV was on 9/14/18 for Claudication of RLE.     Brief chart review also shows that patient was undergoing surveillance for AAA w/o rupture.     I informed the patient of [facility]'s current scheduling guidelines in light of COVID-19 and that I would be submitting their request for scheduling to the clinic's nursing staff for review and to make a determination of what type of follow-up appointment the patient will need (e-visit or in-person) and to determine/enter the necessary orders needed for this follow-up appointment.     Mandy Austin    [facility]  Office: [phone]  Fax [phone]        [phone]

## 2021-04-13 ENCOUNTER — TRANSCRIPTION ENCOUNTER (OUTPATIENT)
Age: 81
End: 2021-04-13

## 2021-04-13 VITALS
DIASTOLIC BLOOD PRESSURE: 65 MMHG | RESPIRATION RATE: 20 BRPM | SYSTOLIC BLOOD PRESSURE: 103 MMHG | HEART RATE: 74 BPM | OXYGEN SATURATION: 94 % | TEMPERATURE: 97 F

## 2021-04-13 DIAGNOSIS — I49.1 ATRIAL PREMATURE DEPOLARIZATION: ICD-10-CM

## 2021-04-13 DIAGNOSIS — I25.10 ATHEROSCLEROTIC HEART DISEASE OF NATIVE CORONARY ARTERY WITHOUT ANGINA PECTORIS: ICD-10-CM

## 2021-04-13 DIAGNOSIS — I10 ESSENTIAL (PRIMARY) HYPERTENSION: ICD-10-CM

## 2021-04-13 LAB
ANION GAP SERPL CALC-SCNC: 11 MMOL/L — SIGNIFICANT CHANGE UP (ref 5–17)
BUN SERPL-MCNC: 25 MG/DL — HIGH (ref 7–23)
CALCIUM SERPL-MCNC: 8.8 MG/DL — SIGNIFICANT CHANGE UP (ref 8.4–10.5)
CHLORIDE SERPL-SCNC: 104 MMOL/L — SIGNIFICANT CHANGE UP (ref 96–108)
CO2 SERPL-SCNC: 24 MMOL/L — SIGNIFICANT CHANGE UP (ref 22–31)
COVID-19 SPIKE DOMAIN AB INTERP: POSITIVE
COVID-19 SPIKE DOMAIN ANTIBODY RESULT: >250 U/ML — HIGH
CREAT SERPL-MCNC: 1.12 MG/DL — SIGNIFICANT CHANGE UP (ref 0.5–1.3)
GLUCOSE SERPL-MCNC: 123 MG/DL — HIGH (ref 70–99)
HCT VFR BLD CALC: 40.3 % — SIGNIFICANT CHANGE UP (ref 39–50)
HGB BLD-MCNC: 12.6 G/DL — LOW (ref 13–17)
MAGNESIUM SERPL-MCNC: 2.2 MG/DL — SIGNIFICANT CHANGE UP (ref 1.6–2.6)
MCHC RBC-ENTMCNC: 30.4 PG — SIGNIFICANT CHANGE UP (ref 27–34)
MCHC RBC-ENTMCNC: 31.3 GM/DL — LOW (ref 32–36)
MCV RBC AUTO: 97.3 FL — SIGNIFICANT CHANGE UP (ref 80–100)
NRBC # BLD: 0 /100 WBCS — SIGNIFICANT CHANGE UP (ref 0–0)
PHOSPHATE SERPL-MCNC: 4.4 MG/DL — SIGNIFICANT CHANGE UP (ref 2.5–4.5)
PLATELET # BLD AUTO: 172 K/UL — SIGNIFICANT CHANGE UP (ref 150–400)
POTASSIUM SERPL-MCNC: 4.6 MMOL/L — SIGNIFICANT CHANGE UP (ref 3.5–5.3)
POTASSIUM SERPL-SCNC: 4.6 MMOL/L — SIGNIFICANT CHANGE UP (ref 3.5–5.3)
RBC # BLD: 4.14 M/UL — LOW (ref 4.2–5.8)
RBC # FLD: 12.9 % — SIGNIFICANT CHANGE UP (ref 10.3–14.5)
SARS-COV-2 IGG+IGM SERPL QL IA: >250 U/ML — HIGH
SARS-COV-2 IGG+IGM SERPL QL IA: POSITIVE
SODIUM SERPL-SCNC: 139 MMOL/L — SIGNIFICANT CHANGE UP (ref 135–145)
WBC # BLD: 7.18 K/UL — SIGNIFICANT CHANGE UP (ref 3.8–10.5)
WBC # FLD AUTO: 7.18 K/UL — SIGNIFICANT CHANGE UP (ref 3.8–10.5)

## 2021-04-13 PROCEDURE — 84484 ASSAY OF TROPONIN QUANT: CPT

## 2021-04-13 PROCEDURE — 36415 COLL VENOUS BLD VENIPUNCTURE: CPT

## 2021-04-13 PROCEDURE — 71045 X-RAY EXAM CHEST 1 VIEW: CPT

## 2021-04-13 PROCEDURE — C1781: CPT

## 2021-04-13 PROCEDURE — 83735 ASSAY OF MAGNESIUM: CPT

## 2021-04-13 PROCEDURE — S2900: CPT

## 2021-04-13 PROCEDURE — 71045 X-RAY EXAM CHEST 1 VIEW: CPT | Mod: 26

## 2021-04-13 PROCEDURE — 93005 ELECTROCARDIOGRAM TRACING: CPT

## 2021-04-13 PROCEDURE — 84100 ASSAY OF PHOSPHORUS: CPT

## 2021-04-13 PROCEDURE — 80048 BASIC METABOLIC PNL TOTAL CA: CPT

## 2021-04-13 PROCEDURE — 82962 GLUCOSE BLOOD TEST: CPT

## 2021-04-13 PROCEDURE — 99231 SBSQ HOSP IP/OBS SF/LOW 25: CPT

## 2021-04-13 PROCEDURE — 85027 COMPLETE CBC AUTOMATED: CPT

## 2021-04-13 PROCEDURE — 86769 SARS-COV-2 COVID-19 ANTIBODY: CPT

## 2021-04-13 PROCEDURE — 93010 ELECTROCARDIOGRAM REPORT: CPT

## 2021-04-13 PROCEDURE — 49650 LAP ING HERNIA REPAIR INIT: CPT

## 2021-04-13 RX ORDER — HYDRALAZINE HCL 50 MG
10 TABLET ORAL ONCE
Refills: 0 | Status: COMPLETED | OUTPATIENT
Start: 2021-04-13 | End: 2021-04-13

## 2021-04-13 RX ORDER — MAGNESIUM SULFATE 500 MG/ML
1 VIAL (ML) INJECTION ONCE
Refills: 0 | Status: COMPLETED | OUTPATIENT
Start: 2021-04-13 | End: 2021-04-13

## 2021-04-13 RX ADMIN — Medication 50 MILLIGRAM(S): at 05:37

## 2021-04-13 RX ADMIN — Medication 10 MILLIGRAM(S): at 02:11

## 2021-04-13 RX ADMIN — Medication 81 MILLIGRAM(S): at 12:27

## 2021-04-13 RX ADMIN — Medication 650 MILLIGRAM(S): at 08:30

## 2021-04-13 RX ADMIN — HEPARIN SODIUM 5000 UNIT(S): 5000 INJECTION INTRAVENOUS; SUBCUTANEOUS at 05:37

## 2021-04-13 RX ADMIN — Medication 100 GRAM(S): at 02:10

## 2021-04-13 RX ADMIN — Medication 650 MILLIGRAM(S): at 05:37

## 2021-04-13 RX ADMIN — SODIUM CHLORIDE 60 MILLILITER(S): 9 INJECTION, SOLUTION INTRAVENOUS at 01:17

## 2021-04-13 NOTE — PROGRESS NOTE ADULT - SUBJECTIVE AND OBJECTIVE BOX
POST-OP DAY: 1 s/p RA R inguinal hernia repair w progrip mesh      SUBJECTIVE:   Patient seen and examined bedside by chief resident  Denmaximino N/V/CP/SOB  Giuseppe CLD  Voiding   Pain well controlled on current regimen     aspirin  chewable 81 milliGRAM(s) Oral daily  heparin   Injectable 5000 Unit(s) SubCutaneous every 8 hours  metoprolol succinate ER 50 milliGRAM(s) Oral daily  tamsulosin 0.4 milliGRAM(s) Oral at bedtime    MEDICATIONS  (PRN):  acetaminophen   Tablet .. 650 milliGRAM(s) Oral every 6 hours PRN Mild Pain (1 - 3)  oxyCODONE    IR 5 milliGRAM(s) Oral every 6 hours PRN Moderate Pain (4 - 6)      I&O's Detail    12 Apr 2021 07:01  -  13 Apr 2021 07:00  --------------------------------------------------------  IN:    IV PiggyBack: 100 mL    Lactated Ringers: 2120 mL  Total IN: 2220 mL    OUT:    Estimated Blood Loss (mL): 10 mL    Indwelling Catheter - Urethral (mL): 820 mL  Total OUT: 830 mL    Total NET: 1390 mL          Vital Signs Last 24 Hrs  T(C): 36.4 (13 Apr 2021 05:25), Max: 36.8 (12 Apr 2021 21:52)  T(F): 97.6 (13 Apr 2021 05:25), Max: 98.2 (12 Apr 2021 21:52)  HR: 68 (13 Apr 2021 05:25) (57 - 75)  BP: 121/65 (13 Apr 2021 05:25) (121/65 - 160/63)  BP(mean): 90 (12 Apr 2021 20:49) (88 - 97)  RR: 16 (13 Apr 2021 05:25) (14 - 29)  SpO2: 98% (13 Apr 2021 05:25) (94% - 100%)    General: NAD, resting comfortably in bed  C/V: NSR  Pulm: Nonlabored breathing, no respiratory distress  Abd: soft, ND, incisions C/D/G w Violette-incisional TTP w/o R/G  Extrem: WWP, no edema, SCDs in place    LABS:                        12.6   7.18  )-----------( 172      ( 13 Apr 2021 06:13 )             40.3     04-13    139  |  104  |  25<H>  ----------------------------<  123<H>  4.6   |  24  |  1.12    Ca    8.8      13 Apr 2021 06:13  Phos  4.4     04-13  Mg     2.2     04-13            RADIOLOGY & ADDITIONAL STUDIES:    
POST-OPERATIVE NOTE    Procedure: RA R inguinal hernia repair with progrip mesh    Diagnosis/Indication: Right Inguinal Hernia    S: Patient reports feeling some abdominal soreness, just got pain meds with improvement of the pain. Denies CP, SOB, GONZALEZ, calf tenderness, dizziness. Pain controlled with medication.      T(C): 36.8 (04-12-21 @ 21:52), Max: 36.8 (04-12-21 @ 21:52)  T(F): 98.2 (04-12-21 @ 21:52), Max: 98.2 (04-12-21 @ 21:52)  HR: 68 (04-12-21 @ 21:52) (57 - 69)  BP: 149/67 (04-12-21 @ 21:52) (134/66 - 149/67)  RR: 16 (04-12-21 @ 21:52) (16 - 29)  SpO2: 94% (04-12-21 @ 21:52) (94% - 100%)  Wt(kg): --                        12.4   10.69 )-----------( 164      ( 12 Apr 2021 18:18 )             39.1     04-12    139  |  106  |  27<H>  ----------------------------<  123<H>  5.0   |  26  |  1.03    Ca    8.8      12 Apr 2021 18:18  Phos  3.9     04-12  Mg     1.9     04-12        Gen: resting comfortably in bed, alert  C/V: Afib rhythm per monitor, HR: 65, BP: 147/67  Pulm: Nonlabored breathing, no respiratory distress, no crepitation.   Abd: soft, nondistended, appropriately tender on right inguinal area, incisions CDI without hematoma. No Drains  Urinary: 14F hernandez   Extrem: WWP, no calf edema, SCDs in place      
INTERVAL HISTORY:  s/p surgery  	  MEDICATIONS:  metoprolol succinate ER 50 milliGRAM(s) Oral daily  tamsulosin 0.4 milliGRAM(s) Oral at bedtime  acetaminophen   Tablet .. 650 milliGRAM(s) Oral every 6 hours PRN  oxyCODONE    IR 5 milliGRAM(s) Oral every 6 hours PRN    aspirin  chewable 81 milliGRAM(s) Oral daily  heparin   Injectable 5000 Unit(s) SubCutaneous every 8 hours  lactated ringers. 1000 milliLiter(s) IV Continuous <Continuous>      PHYSICAL EXAM:  T(C): 36.4 (04-13-21 @ 05:25), Max: 36.8 (04-12-21 @ 21:52)  HR: 68 (04-13-21 @ 05:25) (57 - 75)  BP: 121/65 (04-13-21 @ 05:25) (121/65 - 160/63)  RR: 16 (04-13-21 @ 05:25) (14 - 29)  SpO2: 98% (04-13-21 @ 05:25) (94% - 100%)  Wt(kg): --  I&O's Summary    12 Apr 2021 07:01  -  13 Apr 2021 07:00  --------------------------------------------------------  IN: 2220 mL / OUT: 830 mL / NET: 1390 mL      Height (cm): 167.6 (04-12 @ 13:08)  Weight (kg): 62.6 (04-12 @ 13:08)  BMI (kg/m2): 22.3 (04-12 @ 13:08)  BSA (m2): 1.71 (04-12 @ 13:08)    Appearance: Normal	  Cardiovascular: Normal S1 S2, No JVD, No murmurs, No edema, + ectopics  Respiratory: Lungs clear to auscultation	  Psychiatry: A & O x 3, Mood & affect appropriate  Gastrointestinal:  Soft, tender, + BS	  Skin: No rashes, No ecchymoses, No cyanosis  Neurologic: Non-focal  Extremities:  No clubbing, cyanosis or edema  Vascular: Peripheral pulses palpable 2+ bilaterally    TELEMETRY: 	  NSR wih frequent APCs, couplets and rare triplets, No A Fib seen  ECG:  	  RADIOLOGY:   DIAGNOSTIC TESTING:  [ ] Echocardiogram:  [ ]  Catheterization:  [ ] Stress Test:    OTHER: 	    LABS:	 	    CARDIAC MARKERS:                                  12.6   7.18  )-----------( 172      ( 13 Apr 2021 06:13 )             40.3     04-13    139  |  104  |  25<H>  ----------------------------<  123<H>  4.6   |  24  |  1.12    Ca    8.8      13 Apr 2021 06:13  Phos  4.4     04-13  Mg     2.2     04-13      proBNP:   Lipid Profile:   HgA1c:   TSH:     ASSESSMENT/PLAN:

## 2021-04-13 NOTE — CHART NOTE - NSCHARTNOTEFT_GEN_A_CORE
Asked to evaluate pt given concern for AF. Pt postop hernia repair.  - Telemetry reviewed: sinus arrhythmia, apcs, vpcs. No AFib seen.  - ECG: sinus arrhythmia, apcs, QTc ~410  - VSS. Patient denies CP/SOB/palpitations    Ollie Lindsey MD PGY4

## 2021-04-13 NOTE — PROVIDER CONTACT NOTE (OTHER) - ASSESSMENT
Pt had 2 separate episodes of 3 beats of Vtach, HR were 132 and 173bpm. Pt has been resting in bed. Denied chest pain or sob. /65 HR 68. HR is now ranging 68-71 bpm.
Pt had 3 episodes of bursts of tachycardia, HRs were 133, 144, and 139. After each episode pt resumed to NSR with PACs. Pt is currently in NSR with PAC. Pt was found asleep. Denied chest pain or sob. /63 HR 64.

## 2021-04-13 NOTE — DISCHARGE NOTE NURSING/CASE MANAGEMENT/SOCIAL WORK - PATIENT PORTAL LINK FT
You can access the FollowMyHealth Patient Portal offered by [facility] by registering at the following website: [email or web address]. By joining [employer]’s FollowMyHealth portal, you will also be able to view your health information using other applications (apps) compatible with our system.

## 2021-04-13 NOTE — PROGRESS NOTE ADULT - ASSESSMENT
81yo M, former smoker with a PMH of HTN, HLD, prediabetes, prostate ca (dx 2005) s/p seed implant, CAD with prior PCIs who presented for elective surgery. Now s/p RA R inguinal hernia repair 4/12.     CLD/IVF   Pain/Nausea control PRN  Ruiz, Strict I&Os  Home meds as appropriate (Asa, Flomax, Metoprolol, holding pravastatin and finasteride)  IS/OOBA/SCDs/SQH  AM Labs   23 hour obs  
81yo M, former smoker with a PMH of HTN, HLD, prediabetes, prostate ca (dx 2005) s/p seed implant, CAD with prior PCIs who presented for elective surgery. Now s/p RA R inguinal hernia repair 4/12.     23 Observation   CLD/IVF   Ice Packs   Pain/Nausea control PRN  Das placed by urology - difficult uretra catheterization   Strict I&Os  Home meds as appropriate (Asa, Flomax, Metoprolol, holding pravastatin and finasteride)  IS/OOBA/SCDs/SQH  AM Labs   AM Chest Xray   Scrotal support upon discharge  Call EP/cardiology consult if any sx or ekg changes   DO NOT REMOVE DAS, if any problems call urology   DO NOT DISCHARGE UNTIL CLEARED BY DR. STORY (need to anticoagulate on dc?)     Pre-op Labs:   H/H: 13.9/45.8  Cr: 1.12     Consult:   EP/Cards    Patient developed new Afib in the post op. Patient cardiologist made aware and instructed the care for the night, also in house cardiology was consulted. Patient has no chest pain, SOB, or dyspnea. Reports feeling well, Stable Vital Signs with HR less than 100. Metoprolol and aspirin was restarted in the PO. PO labs within normal limits with troponin < 0,01.

## 2021-04-13 NOTE — PROVIDER CONTACT NOTE (OTHER) - ACTION/TREATMENT ORDERED:
Michael Guadarrama MD stated she will consult with Cardiology.
No intervention at this time. MD Hannah stated MD Esteban will come to see pt today.

## 2021-04-19 ENCOUNTER — APPOINTMENT (OUTPATIENT)
Dept: UROLOGY | Facility: CLINIC | Age: 81
End: 2021-04-19
Payer: MEDICARE

## 2021-04-19 VITALS
HEART RATE: 76 BPM | OXYGEN SATURATION: 95 % | TEMPERATURE: 97.6 F | SYSTOLIC BLOOD PRESSURE: 159 MMHG | DIASTOLIC BLOOD PRESSURE: 79 MMHG

## 2021-04-19 DIAGNOSIS — C61 MALIGNANT NEOPLASM OF PROSTATE: ICD-10-CM

## 2021-04-19 PROBLEM — R33.9 RETENTION OF URINE, UNSPECIFIED: Chronic | Status: ACTIVE | Noted: 2021-04-09

## 2021-04-19 PROBLEM — Z95.5 PRESENCE OF CORONARY ANGIOPLASTY IMPLANT AND GRAFT: Chronic | Status: ACTIVE | Noted: 2021-04-12

## 2021-04-19 PROCEDURE — 99214 OFFICE O/P EST MOD 30 MIN: CPT

## 2021-04-20 DIAGNOSIS — K40.90 UNILATERAL INGUINAL HERNIA, WITHOUT OBSTRUCTION OR GANGRENE, NOT SPECIFIED AS RECURRENT: ICD-10-CM

## 2021-04-21 LAB — BACTERIA UR CULT: NORMAL

## 2021-04-30 ENCOUNTER — APPOINTMENT (OUTPATIENT)
Dept: UROLOGY | Facility: CLINIC | Age: 81
End: 2021-04-30
Payer: MEDICARE

## 2021-04-30 VITALS — HEART RATE: 74 BPM | DIASTOLIC BLOOD PRESSURE: 69 MMHG | SYSTOLIC BLOOD PRESSURE: 169 MMHG | TEMPERATURE: 97.3 F

## 2021-04-30 LAB
BILIRUB UR QL STRIP: NORMAL
CLARITY UR: CLEAR
COLLECTION METHOD: NORMAL
GLUCOSE UR-MCNC: NORMAL
HCG UR QL: 4 EU/DL
HGB UR QL STRIP.AUTO: NORMAL
KETONES UR-MCNC: NORMAL
LEUKOCYTE ESTERASE UR QL STRIP: NORMAL
NITRITE UR QL STRIP: NORMAL
PH UR STRIP: 6
PROT UR STRIP-MCNC: 30
SP GR UR STRIP: 1.02

## 2021-04-30 PROCEDURE — 51798 US URINE CAPACITY MEASURE: CPT | Mod: 59

## 2021-04-30 PROCEDURE — 51784 ANAL/URINARY MUSCLE STUDY: CPT

## 2021-04-30 PROCEDURE — 51741 ELECTRO-UROFLOWMETRY FIRST: CPT

## 2021-04-30 PROCEDURE — 51728 CYSTOMETROGRAM W/VP: CPT

## 2021-04-30 PROCEDURE — 51797 INTRAABDOMINAL PRESSURE TEST: CPT

## 2021-05-07 ENCOUNTER — APPOINTMENT (OUTPATIENT)
Dept: UROLOGY | Facility: CLINIC | Age: 81
End: 2021-05-07
Payer: MEDICARE

## 2021-05-07 VITALS — SYSTOLIC BLOOD PRESSURE: 135 MMHG | TEMPERATURE: 97.7 F | HEART RATE: 67 BPM | DIASTOLIC BLOOD PRESSURE: 81 MMHG

## 2021-05-07 PROCEDURE — 99214 OFFICE O/P EST MOD 30 MIN: CPT

## 2021-05-07 NOTE — ASSESSMENT
[FreeTextEntry1] : \par \par Impression/plan: 81-year-old gentleman with history of prostate cancer status post brachytherapy with recent onset of incomplete bladder emptying requiring clean intermittent catheterization. He appears to catheterize for significantly larger volumes compared to volitional voids. He is referred today for further evaluation of his voiding dysfunction. UDS demonstrates DO w/Leak and BENAVIDEZ. I discussed with patient recommendation for outlet procedure. I also discussed with Dr. Preciado, agrees with plan. \par

## 2021-05-07 NOTE — HISTORY OF PRESENT ILLNESS
[FreeTextEntry1] : 81 year old gentleman referred by Dr. Preciado with history of prostate cancer in 2005, treated with seeds who developed gross hematuria in May 2020. 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 His blood thinners were stopped after patient continued to have 6-8 weeks of intermittent hematuria. Followed up with the urology clinic at [facility] and had multiple failed voiding trials. Eventually cystoscopy was performed in September 2020, patient does not report any abnormal findings. He was told after the cysto that he would need to perform CIC. He currently catheterizes with a 14 Fr coude catheter 2 x day. He does keep a voiding diary. In the morning he reports an urge to urinate but cant and needs to catheterize. He records volumes between 400-500 cc each time he catheterizes. After the initial CIC he is able to voiding through out the day and records voided volumes of ~150 cc.\par Currently on Finasteride and Flomax.\par \par UDS - \par Filling/Storage Phase: First sensation 57 mL, First desire 112 mL, Normal desire 139 mL and Cystometric capacity 280 mL. Involuntary contractions were present . Pt demonstrated stress urinary incontinence. Pt demomnstrated urge urinary incontinence. DLPP 164 cm/H20. Compliance: decreased. EMG Activity: normal. \par \par Voiding Phase: Qmax 6.9 mL/s , Pdet at Qmax 146 cm/H20, Qmax at Pdet 5.7 mL/s, Pavg 129.6 cm/H20, Qavg 3.6 mL/s, voiding time 54.2 mm:sec, voided volume 267 mL and post void residual 14 mL. EMG activity was synergic. \par \par Urodynamic Interpretation : \par Normal bladder sensation. Decreased bladder capacity. Patient experiencing detrusor instability. Pt has an appropriate EMG response to involuntary contractions:. The patient has moderate  mixed incontinence. The uroflow rate is decreased. The uroflow pattern is plateaued. The detrusor contractility is normal. The patient has bladder outlet obstruction. The intravesical voiding pressure is increased. The patient has incomplete bladder emptying, a post void residual of 14 cc. The spincter activity is normal synergic. \par Additional Procedure Related Findings/Comments: Patient did leak urine onto the floor during the DO w/ leaks. Patient had a capacity of roughly 281 ml in bladder. \par BOOI: 132.2. \par

## 2021-05-07 NOTE — LETTER BODY
[Dear  ___] : Dear  [unfilled], [Consult Letter:] : I had the pleasure of evaluating your patient, [unfilled]. [Please see my note below.] : Please see my note below. [Consult Closing:] : Thank you very much for allowing me to participate in the care of this patient.  If you have any questions, please do not hesitate to contact me. [Sincerely,] : Sincerely, [FreeTextEntry3] : Lori Sadler MD\par System Director [facility]\par Department of Urology\par [facility] \par   at [facility]\par  of Urology\par [facility] [employer] at [facility]\par

## 2021-05-07 NOTE — PHYSICAL EXAM
[General Appearance - Well Developed] : well developed [General Appearance - Well Nourished] : well nourished [Normal Appearance] : normal appearance [Well Groomed] : well groomed [General Appearance - In No Acute Distress] : no acute distress [Edema] : no peripheral edema [] : no respiratory distress [Respiration, Rhythm And Depth] : normal respiratory rhythm and effort [Exaggerated Use Of Accessory Muscles For Inspiration] : no accessory muscle use [Oriented To Time, Place, And Person] : oriented to person, place, and time

## 2021-05-07 NOTE — REVIEW OF SYSTEMS
[id number]     [employer] Chemiluminescence     URINALYSIS SCREEN 30Oct2018 06:08PM ASHLEY ALLEN   [see HPI] : see HPI [Negative] : Gastrointestinal

## 2021-05-12 ENCOUNTER — APPOINTMENT (OUTPATIENT)
Dept: UROLOGY | Facility: CLINIC | Age: 81
End: 2021-05-12
Payer: MEDICARE

## 2021-05-12 VITALS — DIASTOLIC BLOOD PRESSURE: 78 MMHG | SYSTOLIC BLOOD PRESSURE: 140 MMHG | HEART RATE: 66 BPM | TEMPERATURE: 97.2 F

## 2021-05-12 PROCEDURE — 99214 OFFICE O/P EST MOD 30 MIN: CPT

## 2021-05-12 NOTE — ASSESSMENT
[FreeTextEntry1] : 81 year old male with history of prostate cancer, h/o brachytherapy, radiation cystitis now requiring CIC BID\par UDS reviewed: bladder outlet obstruction\par Also small capacity bladder\par Significant medical comorbidities, 5 cardiac stents, significant smoking history\par Could consider less invasive procedure such as Urolift\par Recommend cystoscopy to evaluate prostatic obstruction

## 2021-05-12 NOTE — HISTORY OF PRESENT ILLNESS
[FreeTextEntry1] : This is an 80 year old male with history of prostate cancer in 2005, treated with seeds who developed gross hematuria in May 2020. 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  His blood thinners were stopped after patient continued to have  6-8 weeks of intermittent hematuria.\par Followed up with the urology clinic at [facility] and had multiple failed voiding trials.  Eventually cystoscopy was performed in September 2020, patient does not report any abnormal findings. \par He was told after the cysto that he would need to perform CIC.  He currently catheterizes with a 14 Fr coude catheter 2 x day.\par He does keep a voiding diary.  In the morning he reports an urge to urinate but cant and needs to catheterize.  He records volumes between 400-500 cc each time he catheterizes.  After the initial CIC he is able to voiding through out the day and records voided volumes of ~150 cc.\par Currently on Finasteride and FLomax\par \par Denies any hematuria or dysuria\par \par Dr. John Villatoro will be doing hernia repair in the coming future.\par \par Surgical Hx: cardiac stents x 5\par Social Hx: former smoker ( 40 year ppd smoking history), no ETOH, retired \par Family Hx: sister with lung Cancer\par \par 5/12/21 Here for f/u. Underwent UDS with Dr. Sadler which shows small capacity bladder with bladder outlet obstruction. Had hernia surgery. \par  [Urinary Incontinence] : urinary incontinence [Urinary Retention] : urinary retention [Urinary Urgency] : no urinary urgency [Urinary Frequency] : no urinary frequency [Nocturia] : no nocturia [Straining] : straining [Weak Stream] : weak stream [Intermittency] : no intermittency [Dysuria] : no dysuria [Hematuria - Gross] : no gross hematuria [Fever] : no fever

## 2021-05-19 ENCOUNTER — APPOINTMENT (OUTPATIENT)
Dept: UROLOGY | Facility: CLINIC | Age: 81
End: 2021-05-19
Payer: MEDICARE

## 2021-05-19 VITALS — DIASTOLIC BLOOD PRESSURE: 81 MMHG | TEMPERATURE: 96 F | SYSTOLIC BLOOD PRESSURE: 137 MMHG | HEART RATE: 69 BPM

## 2021-05-19 PROCEDURE — 52000 CYSTOURETHROSCOPY: CPT

## 2021-05-26 ENCOUNTER — APPOINTMENT (OUTPATIENT)
Dept: UROLOGY | Facility: CLINIC | Age: 81
End: 2021-05-26
Payer: MEDICARE

## 2021-05-26 VITALS — DIASTOLIC BLOOD PRESSURE: 83 MMHG | TEMPERATURE: 97.6 F | SYSTOLIC BLOOD PRESSURE: 149 MMHG | HEART RATE: 65 BPM

## 2021-05-26 PROCEDURE — 99213 OFFICE O/P EST LOW 20 MIN: CPT

## 2021-05-26 NOTE — ASSESSMENT
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

## 2021-05-26 NOTE — HISTORY OF PRESENT ILLNESS
[FreeTextEntry1] : This is an 80 year old male with history of prostate cancer in 2005, treated with seeds who developed gross hematuria in May 2020. 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  His blood thinners were stopped after patient continued to have  6-8 weeks of intermittent hematuria.\par Followed up with the urology clinic at [facility] and had multiple failed voiding trials.  Eventually cystoscopy was performed in September 2020, patient does not report any abnormal findings. \par He was told after the cysto that he would need to perform CIC.  He currently catheterizes with a 14 Fr coude catheter 2 x day.\par He does keep a voiding diary.  In the morning he reports an urge to urinate but cant and needs to catheterize.  He records volumes between 400-500 cc each time he catheterizes.  After the initial CIC he is able to voiding through out the day and records voided volumes of ~150 cc.\par Currently on Finasteride and FLomax\par \par Denies any hematuria or dysuria\par \par Dr. John Villatoro will be doing hernia repair in the coming future.\par \par Surgical Hx: cardiac stents x 5\par Social Hx: former smoker ( 40 year ppd smoking history), no ETOH, retired \par Family Hx: sister with lung Cancer\par \par 5/26/21 Here for f/u. 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 He has returned to doing CIC once a day, voiding the rest of the day. PVR today with CIC = 600cc this morning. He has no complaints. \par  [Urinary Incontinence] : urinary incontinence [Urinary Retention] : urinary retention [Urinary Urgency] : no urinary urgency [Urinary Frequency] : no urinary frequency [Nocturia] : no nocturia [Straining] : straining [Weak Stream] : weak stream [Intermittency] : no intermittency [Dysuria] : no dysuria [Hematuria - Gross] : no gross hematuria [Fever] : no fever

## 2021-07-28 ENCOUNTER — APPOINTMENT (OUTPATIENT)
Dept: UROLOGY | Facility: CLINIC | Age: 81
End: 2021-07-28
Payer: MEDICARE

## 2021-07-28 VITALS — DIASTOLIC BLOOD PRESSURE: 81 MMHG | SYSTOLIC BLOOD PRESSURE: 154 MMHG | HEART RATE: 73 BPM | TEMPERATURE: 97.3 F

## 2021-07-28 PROCEDURE — 99213 OFFICE O/P EST LOW 20 MIN: CPT

## 2021-07-28 NOTE — ASSESSMENT
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

## 2021-07-28 NOTE — HISTORY OF PRESENT ILLNESS
[FreeTextEntry1] : This is an 80 year old male with history of prostate cancer in 2005, treated with seeds who developed gross hematuria in May 2020. 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 His blood thinners were stopped after patient continued to have 6-8 weeks of intermittent hematuria.\par Followed up with the urology clinic at [facility] and had multiple failed voiding trials. Eventually cystoscopy was performed in September 2020, patient does not report any abnormal findings. \par He was told after the cysto that he would need to perform CIC. He currently catheterizes with a 14 Fr coude catheter 2 x day.\par He does keep a voiding diary. In the morning he reports an urge to urinate but cant and needs to catheterize. He records volumes between 400-500 cc each time he catheterizes. After the initial CIC he is able to voiding through out the day and records voided volumes of ~150 cc.\par Currently on Finasteride and FLomax\par \par Denies any hematuria or dysuria\par \par Dr. John Villatoro will be doing hernia repair in the coming future.\par \par Surgical Hx: cardiac stents x 5\par Social Hx: former smoker ( 40 year ppd smoking history), no ETOH, retired \par Family Hx: sister with lung Cancer\par \par 5/26/21 Here for f/u. 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 He has returned to doing CIC once a day, voiding the rest of the day. PVR today with CIC = 600cc this morning. He has no complaints\par \par 7/28/21 Here for f/u. Doing well. He is doing CIC 1-2x/day, 500cc. He voids the rest of the day, 150-200cc each time, no straining. No hematuria.

## 2021-07-28 NOTE — PHYSICAL EXAM
[General Appearance - Well Developed] : well developed [General Appearance - Well Nourished] : well nourished [Normal Appearance] : normal appearance [Well Groomed] : well groomed [General Appearance - In No Acute Distress] : no acute distress [Abdomen Soft] : soft [Skin Color & Pigmentation] : normal skin color and pigmentation [Edema] : no peripheral edema [Oriented To Time, Place, And Person] : oriented to person, place, and time [Affect] : the affect was normal [No Focal Deficits] : no focal deficits [No Palpable Adenopathy] : no palpable adenopathy

## 2021-11-22 ENCOUNTER — APPOINTMENT (OUTPATIENT)
Dept: UROLOGY | Facility: CLINIC | Age: 81
End: 2021-11-22
Payer: MEDICARE

## 2021-11-22 VITALS — HEART RATE: 76 BPM | TEMPERATURE: 98.6 F | SYSTOLIC BLOOD PRESSURE: 155 MMHG | DIASTOLIC BLOOD PRESSURE: 78 MMHG

## 2021-11-22 DIAGNOSIS — N32.0 BLADDER-NECK OBSTRUCTION: ICD-10-CM

## 2021-11-22 PROCEDURE — 99213 OFFICE O/P EST LOW 20 MIN: CPT | Mod: 25

## 2021-11-22 PROCEDURE — 51798 US URINE CAPACITY MEASURE: CPT

## 2021-11-22 NOTE — PHYSICAL EXAM
[General Appearance - Well Developed] : well developed [General Appearance - Well Nourished] : well nourished [Normal Appearance] : normal appearance [Well Groomed] : well groomed [General Appearance - In No Acute Distress] : no acute distress [Edema] : no peripheral edema [] : no respiratory distress [Respiration, Rhythm And Depth] : normal respiratory rhythm and effort [Exaggerated Use Of Accessory Muscles For Inspiration] : no accessory muscle use [Oriented To Time, Place, And Person] : oriented to person, place, and time [Affect] : the affect was normal [Mood] : the mood was normal [Not Anxious] : not anxious [Abdomen Soft] : soft [Abdomen Tenderness] : non-tender [Costovertebral Angle Tenderness] : no ~M costovertebral angle tenderness

## 2021-11-22 NOTE — ASSESSMENT
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 Patient has not voided in 2 hours and does not have the urge to urinate.\par \par Plan: \par -PSA today\par -F/u 6 months\par -Continue finasteride for h/o hematuria

## 2021-11-22 NOTE — HISTORY OF PRESENT ILLNESS
[FreeTextEntry1] : This is an 80 year old male with history of prostate cancer in 2005, treated with seeds who developed gross hematuria in May 2020. 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 His blood thinners were stopped after patient continued to have 6-8 weeks of intermittent hematuria.\par Followed up with the urology clinic at [facility] and had multiple failed voiding trials. Eventually cystoscopy was performed in September 2020, patient does not report any abnormal findings. \par He was told after the cysto that he would need to perform CIC. He currently catheterizes with a 14 Fr coude catheter 2 x day.\par He does keep a voiding diary. In the morning he reports an urge to urinate but cant and needs to catheterize. He records volumes between 400-500 cc each time he catheterizes. After the initial CIC he is able to voiding through out the day and records voided volumes of ~150 cc.\par Currently on Finasteride and FLomax\par \par Denies any hematuria or dysuria\par \par Dr. John Villatoro will be doing hernia repair in the coming future.\par \par Surgical Hx: cardiac stents x 5\par Social Hx: former smoker ( 40 year ppd smoking history), no ETOH, retired \par Family Hx: sister with lung Cancer\par \par 5/26/21 Here for f/u. 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 He has returned to doing CIC once a day, voiding the rest of the day. PVR today with CIC = 600cc this morning. He has no complaints\par \par 7/28/21 Here for f/u. Doing well. He is doing CIC 1-2x/day, 500cc. He voids the rest of the day, 150-200cc each time, no straining. No hematuria. \par \par 11/22/21: Here for f/u. Doing well. CIC 1-2x/day, between 200-500ml. He has not done it in 4 days as he has been able to void. When he is able to void, he has some intermittent straining. No hematuria. Still taking tamsulosin & finasteride. Nocturia x1/night.

## 2021-11-23 ENCOUNTER — NON-APPOINTMENT (OUTPATIENT)
Age: 81
End: 2021-11-23

## 2021-11-23 LAB — PSA SERPL-MCNC: <0.01 NG/ML

## 2022-02-17 ENCOUNTER — RX RENEWAL (OUTPATIENT)
Age: 82
End: 2022-02-17

## 2022-04-22 ENCOUNTER — RX RENEWAL (OUTPATIENT)
Age: 82
End: 2022-04-22

## 2022-05-16 ENCOUNTER — RX RENEWAL (OUTPATIENT)
Age: 82
End: 2022-05-16

## 2022-05-23 ENCOUNTER — APPOINTMENT (OUTPATIENT)
Dept: UROLOGY | Facility: CLINIC | Age: 82
End: 2022-05-23

## 2022-06-01 ENCOUNTER — APPOINTMENT (OUTPATIENT)
Dept: UROLOGY | Facility: CLINIC | Age: 82
End: 2022-06-01

## 2022-07-20 ENCOUNTER — APPOINTMENT (OUTPATIENT)
Dept: HEART AND VASCULAR | Facility: CLINIC | Age: 82
End: 2022-07-20

## 2022-07-20 ENCOUNTER — NON-APPOINTMENT (OUTPATIENT)
Age: 82
End: 2022-07-20

## 2022-07-20 VITALS
HEART RATE: 68 BPM | HEIGHT: 64 IN | OXYGEN SATURATION: 95 % | WEIGHT: 140 LBS | DIASTOLIC BLOOD PRESSURE: 79 MMHG | BODY MASS INDEX: 23.9 KG/M2 | SYSTOLIC BLOOD PRESSURE: 153 MMHG | TEMPERATURE: 97.5 F

## 2022-07-20 PROCEDURE — 36415 COLL VENOUS BLD VENIPUNCTURE: CPT

## 2022-07-20 PROCEDURE — 99213 OFFICE O/P EST LOW 20 MIN: CPT

## 2022-07-20 NOTE — HISTORY OF PRESENT ILLNESS
[Preoperative Visit] : for a medical evaluation prior to surgery [Scheduled Procedure ___] : a [unfilled] [Date of Surgery ___] : on [unfilled] [Surgeon Name ___] : surgeon: [unfilled] [FreeTextEntry1] : - Dr Quiroga in [place] retired,  [place]\par Derm- Dr Navin Muniz in [place]  [phone] [de-identified] : Tel: [phone], Fax: [phone]

## 2022-07-20 NOTE — REASON FOR VISIT
[Follow-Up - Clinic] : a clinic follow-up of [Coronary Artery Disease] : coronary artery disease [Hyperlipidemia] : hyperlipidemia [Hypertension] : hypertension [FreeTextEntry1] : s/p multiple stents, last one 11/3/16(pLAD) after presenting with CP and a very abnormal nuclear stress test.  Currently pain free. Walks a lot even in cold weather\par 1/27/20  Dx with DJD of right hip and osteopenia\par 6/12/20 Admitted to [facility] with Hematuria 2/T prior brachytherapy.  It  started May 9th.  Plavix stopped along with Quinipril.  Received a Tx\par 8/3/20  No more blood but + urinary retention requiring 2 visits to [facility].  Ruiz in, developed LE edema.  Labs in June were good.\par 12/11/20  Self catheterized for urination.  Had labs Sept at [facility]\par 3/4/21 Got first Covid Vaccine, has a new right inguinal bulge present x 1 week according to the pt.  Not painful\par 4/1/21 Preop for hernia\par 7/20/22 Hernia went well.  Had URI with a lot of secretions and coughing.  Pharmacist gave him expectorant and cough med. Moderately better\par \par EKG: NSR, frequent APCs,  normal axis and intervals, no ST-Tw abnormalities. 3/4/21

## 2022-07-20 NOTE — PHYSICAL EXAM
[General Appearance - Well Developed] : well developed [Normal Appearance] : normal appearance [Well Groomed] : well groomed [General Appearance - Well Nourished] : well nourished [No Deformities] : no deformities [General Appearance - In No Acute Distress] : no acute distress [Respiration, Rhythm And Depth] : normal respiratory rhythm and effort [Exaggerated Use Of Accessory Muscles For Inspiration] : no accessory muscle use [Auscultation Breath Sounds / Voice Sounds] : lungs were clear to auscultation bilaterally [Heart Rate And Rhythm] : heart rate and rhythm were normal [Heart Sounds] : normal S1 and S2 [Murmurs] : no murmurs present [Abdomen Soft] : soft [Abdomen Tenderness] : non-tender [Abdomen Mass (___ Cm)] : no abdominal mass palpated [Abnormal Walk] : normal gait [Gait - Sufficient For Exercise Testing] : the gait was sufficient for exercise testing [Nail Clubbing] : no clubbing of the fingernails [Cyanosis, Localized] : no localized cyanosis [Petechial Hemorrhages (___cm)] : no petechial hemorrhages [Skin Color & Pigmentation] : normal skin color and pigmentation [] : no rash [No Venous Stasis] : no venous stasis [No Skin Ulcers] : no skin ulcer [No Xanthoma] : no  xanthoma was observed [Normal Conjunctiva] : the conjunctiva exhibited no abnormalities [Eyelids - No Xanthelasma] : the eyelids demonstrated no xanthelasmas [Oriented To Time, Place, And Person] : oriented to person, place, and time [Affect] : the affect was normal [Mood] : the mood was normal [No Anxiety] : not feeling anxious [FreeTextEntry1] : dentures

## 2022-07-20 NOTE — ASSESSMENT
[FreeTextEntry1] : ASHD- s/p multiple stents, pain free, walks 2-3 miles/day.  OK in cold weather, still doing well but walking less\par \par LE edema- recent labs at  were good, BP is up off quinapril and there is moderate edema, will start Lasix 20 mg and K 10 mEq MWF.  Edema resolved, off Q, lasix and K. 1+ edema is back\par \par HLD- Lipids at goal 11/2016,   rechecked and excellent 7/2018, repeated 7/2019, excellent.  Now getting labs at \par \par HTN- was excellent, off Quinipril, now up and having LE edema.  Will consider diuretic if it stays up.\par \par Large right inguinal hernia- referred to surgery, I can not reduce it as it just pops right back out. s/p herniorraphy\par \par Hematuria- Admitted to [facility] 5/9/20 with urinary retention with h/o Brachytherapy from 15 y/a.  Plavix and Quinipril DCed.  Had Ruiz Bag. Saw Dr Preciado who dx a bladder outlet obstruction.\par \par Keratoses- handed a referrel to Derm, lives in [place], he will see someone in [place]. Has not gone as of 1/23/18, finally he went to Dr Navin Muniz, had several skin cancers removed from face and scalp.\par \par h/o Prostate CA- needs f/u with Urology, Dr Quiroga retired,  Told to see a new Urologist, PSA sent. Zero.  Followed at [facility]. Saw Dr Preciado who dx a bladder outlet obstruction.\par \par Prediabetes- A1c 5.9, diet better, 5.8.  Need recent labs.  Labs were drawn today in Office. \par \par Cough- steroid inhaler given

## 2022-07-21 DIAGNOSIS — R06.2 WHEEZING: ICD-10-CM

## 2022-07-21 LAB
ALBUMIN SERPL ELPH-MCNC: 4.2 G/DL
ALP BLD-CCNC: 100 U/L
ALT SERPL-CCNC: 8 U/L
ANION GAP SERPL CALC-SCNC: 14 MMOL/L
AST SERPL-CCNC: 14 U/L
BASOPHILS # BLD AUTO: 0.08 K/UL
BASOPHILS NFR BLD AUTO: 1 %
BILIRUB SERPL-MCNC: 0.7 MG/DL
BUN SERPL-MCNC: 22 MG/DL
CALCIUM SERPL-MCNC: 9.6 MG/DL
CHLORIDE SERPL-SCNC: 103 MMOL/L
CHOLEST SERPL-MCNC: 148 MG/DL
CO2 SERPL-SCNC: 23 MMOL/L
CREAT SERPL-MCNC: 1.04 MG/DL
EGFR: 71 ML/MIN/1.73M2
EOSINOPHIL # BLD AUTO: 0.22 K/UL
EOSINOPHIL NFR BLD AUTO: 2.7 %
ESTIMATED AVERAGE GLUCOSE: 134 MG/DL
GLUCOSE SERPL-MCNC: 99 MG/DL
HBA1C MFR BLD HPLC: 6.3 %
HCT VFR BLD CALC: 46.6 %
HDLC SERPL-MCNC: 61 MG/DL
HGB BLD-MCNC: 14.1 G/DL
IMM GRANULOCYTES NFR BLD AUTO: 0.6 %
LDLC SERPL CALC-MCNC: 71 MG/DL
LYMPHOCYTES # BLD AUTO: 1.48 K/UL
LYMPHOCYTES NFR BLD AUTO: 18.2 %
MAN DIFF?: NORMAL
MCHC RBC-ENTMCNC: 29.7 PG
MCHC RBC-ENTMCNC: 30.3 GM/DL
MCV RBC AUTO: 98.3 FL
MONOCYTES # BLD AUTO: 0.69 K/UL
MONOCYTES NFR BLD AUTO: 8.5 %
NEUTROPHILS # BLD AUTO: 5.61 K/UL
NEUTROPHILS NFR BLD AUTO: 69 %
NONHDLC SERPL-MCNC: 88 MG/DL
PLATELET # BLD AUTO: 224 K/UL
POTASSIUM SERPL-SCNC: 4.5 MMOL/L
PROT SERPL-MCNC: 6.6 G/DL
RBC # BLD: 4.74 M/UL
RBC # FLD: 15 %
SODIUM SERPL-SCNC: 141 MMOL/L
T3 SERPL-MCNC: 96 NG/DL
T4 SERPL-MCNC: 7.4 UG/DL
TRIGL SERPL-MCNC: 82 MG/DL
TSH SERPL-ACNC: 1.44 UIU/ML
WBC # FLD AUTO: 8.13 K/UL

## 2022-07-21 RX ORDER — FLUTICASONE PROPIONATE 110 UG/1
110 AEROSOL, METERED RESPIRATORY (INHALATION) TWICE DAILY
Qty: 1 | Refills: 2 | Status: ACTIVE | COMMUNITY
Start: 2022-07-21 | End: 1900-01-01

## 2022-07-22 ENCOUNTER — RX CHANGE (OUTPATIENT)
Age: 82
End: 2022-07-22

## 2022-07-22 RX ORDER — BUDESONIDE 90 UG/1
90 AEROSOL, POWDER RESPIRATORY (INHALATION)
Qty: 1 | Refills: 1 | Status: DISCONTINUED | COMMUNITY
Start: 2022-07-20 | End: 2022-07-22

## 2023-01-20 ENCOUNTER — APPOINTMENT (OUTPATIENT)
Dept: HEART AND VASCULAR | Facility: CLINIC | Age: 83
End: 2023-01-20
Payer: MEDICARE

## 2023-01-20 VITALS
BODY MASS INDEX: 23.05 KG/M2 | RESPIRATION RATE: 14 BRPM | DIASTOLIC BLOOD PRESSURE: 80 MMHG | WEIGHT: 135 LBS | HEART RATE: 70 BPM | SYSTOLIC BLOOD PRESSURE: 142 MMHG | TEMPERATURE: 98.2 F | OXYGEN SATURATION: 97 % | HEIGHT: 64 IN

## 2023-01-20 DIAGNOSIS — Z23 ENCOUNTER FOR IMMUNIZATION: ICD-10-CM

## 2023-01-20 DIAGNOSIS — R05.9 COUGH, UNSPECIFIED: ICD-10-CM

## 2023-01-20 PROCEDURE — 90670 PCV13 VACCINE IM: CPT

## 2023-01-20 PROCEDURE — 99214 OFFICE O/P EST MOD 30 MIN: CPT

## 2023-01-20 PROCEDURE — G0009: CPT

## 2023-01-20 NOTE — ASSESSMENT
[FreeTextEntry1] : ASHD- s/p multiple stents, pain free, walks 2-3 miles/day.  OK in cold weather, still doing well but walking less\par \par Recent URI- Persistent cough after course of Medrol, Doxy and Benzonatate.  Coarse BS at bases.  Z kelsy, Robitussin, Tessalon, chest PT shown to friend.\par \par LE edema- recent labs at  were good, BP is up off quinapril and there is moderate edema, will start Lasix 20 mg and K 10 mEq MWF.  Edema resolved, off Q, lasix and K. 1+ edema is back\par \par HLD- Lipids at goal 11/2016,   rechecked and excellent 7/2018, repeated 7/2019, excellent.  Now getting labs at \par \par HTN- was excellent, off Quinipril, now up and having LE edema.  Will consider diuretic if it stays up.\par \par Large right inguinal hernia- referred to surgery, I can not reduce it as it just pops right back out. s/p herniorraphy\par \par Hematuria- Admitted to [facility] 5/9/20 with urinary retention with h/o Brachytherapy from 15 y/a.  Plavix and Quinipril DCed.  Had Ruiz Bag. Saw Dr Preciado who dx a bladder outlet obstruction.\par \par Keratoses- handed a referrel to Derm, lives in [place], he will see someone in [place]. Has not gone as of 1/23/18, finally he went to Dr Navin Muniz, had several skin cancers removed from face and scalp.\par \par h/o Prostate CA- needs f/u with Urology, Dr Quiroga retired,  Told to see a new Urologist, PSA sent. Zero.  Followed at [facility]. Saw Dr Preciado who dx a bladder outlet obstruction.\par \par Prediabetes- A1c 5.9, diet better, 5.8.  Need recent labs.  Labs were drawn today in Office. \par \par Cough- steroid inhaler given

## 2023-01-20 NOTE — HISTORY OF PRESENT ILLNESS
[Preoperative Visit] : for a medical evaluation prior to surgery [Scheduled Procedure ___] : a [unfilled] [Date of Surgery ___] : on [unfilled] [Surgeon Name ___] : surgeon: [unfilled] [FreeTextEntry1] : - Dr Quiroga in [place] retired,  [place]\par Derm- Dr Navin Muniz in [place]  [phone] [de-identified] : Tel: [phone], Fax: [phone]

## 2023-01-20 NOTE — REASON FOR VISIT
[Follow-Up - Clinic] : a clinic follow-up of [Coronary Artery Disease] : coronary artery disease [Hyperlipidemia] : hyperlipidemia [Hypertension] : hypertension [FreeTextEntry1] : s/p multiple stents, last one 11/3/16(pLAD) after presenting with CP and a very abnormal nuclear stress test.  Currently pain free. Walks a lot even in cold weather\par 1/27/20  Dx with DJD of right hip and osteopenia\par 6/12/20 Admitted to [facility] with Hematuria 2/T prior brachytherapy.  It  started May 9th.  Plavix stopped along with Quinipril.  Received a Tx\par 8/3/20  No more blood but + urinary retention requiring 2 visits to [facility].  [place]iz in, developed LE edema.  Labs in June were good.\par 12/11/20  Self catheterized for urination.  Had labs Sept at [facility]\par 3/4/21 Got first Covid Vaccine, has a new right inguinal bulge present x 1 week according to the pt.  Not painful\par 1/20/23  Went to Urgent Care with URI, PCR (-), Given Doxy, Medrol dose Artem and Benzonatate.  Had (-) PCR.Had flu shot and Covid booster Sept 29th\par 4/1/21 Preop for hernia\par 7/20/22 Hernia went well.  Had URI with a lot of secretions and coughing.  Pharmacist gave him expectorant and cough med. Moderately better\par \par EKG: NSR, frequent APCs,  normal axis and intervals, no ST-Tw abnormalities. 3/4/21

## 2023-02-06 ENCOUNTER — APPOINTMENT (OUTPATIENT)
Dept: UROLOGY | Facility: CLINIC | Age: 83
End: 2023-02-06
Payer: MEDICARE

## 2023-02-06 VITALS — SYSTOLIC BLOOD PRESSURE: 156 MMHG | HEART RATE: 65 BPM | DIASTOLIC BLOOD PRESSURE: 71 MMHG | TEMPERATURE: 98.1 F

## 2023-02-06 DIAGNOSIS — R33.9 RETENTION OF URINE, UNSPECIFIED: ICD-10-CM

## 2023-02-06 DIAGNOSIS — Z92.3 PERSONAL HISTORY OF IRRADIATION: ICD-10-CM

## 2023-02-06 DIAGNOSIS — Z85.46 PERSONAL HISTORY OF MALIGNANT NEOPLASM OF PROSTATE: ICD-10-CM

## 2023-02-06 PROCEDURE — 99213 OFFICE O/P EST LOW 20 MIN: CPT

## 2023-02-06 NOTE — ASSESSMENT
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

## 2023-02-06 NOTE — HISTORY OF PRESENT ILLNESS
[FreeTextEntry1] : This is an 80 year old male with history of prostate cancer in 2005, treated with seeds who developed gross hematuria in May 2020. 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 His blood thinners were stopped after patient continued to have 6-8 weeks of intermittent hematuria.\par Followed up with the urology clinic at [facility] and had multiple failed voiding trials. Eventually cystoscopy was performed in September 2020, patient does not report any abnormal findings. \par He was told after the cysto that he would need to perform CIC. He currently catheterizes with a 14 Fr coude catheter 2 x day.\par He does keep a voiding diary. In the morning he reports an urge to urinate but cant and needs to catheterize. He records volumes between 400-500 cc each time he catheterizes. After the initial CIC he is able to voiding through out the day and records voided volumes of ~150 cc.\par Currently on Finasteride and FLomax\par \par Denies any hematuria or dysuria\par \par Dr. John Villatoro will be doing hernia repair in the coming future.\par \par Surgical Hx: cardiac stents x 5\par Social Hx: former smoker ( 40 year ppd smoking history), no ETOH, retired \par Family Hx: sister with lung Cancer\par \par 5/26/21 Here for f/u. 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 He has returned to doing CIC once a day, voiding the rest of the day. PVR today with CIC = 600cc this morning. He has no complaints\par \par 7/28/21 Here for f/u. Doing well. He is doing CIC 1-2x/day, 500cc. He voids the rest of the day, 150-200cc each time, no straining. No hematuria. \par \par 11/22/21: Here for f/u. Doing well. CIC 1-2x/day, between 200-500ml. He has not done it in 4 days as he has been able to void. When he is able to void, he has some intermittent straining. No hematuria. Still taking tamsulosin & finasteride. Nocturia x1/night. \par \par 2/06/23 Here for f/u. Doing CIC 2x day. Had one episode of hematuria with catheterizing last week when he stated he had some difficulty catheterizing. No further hematuria or difficulty. Also voids intermittently. Last PSA 11/2021 undetectable. Last creatinine 7/2022 = 1.0 [Urinary Incontinence] : urinary incontinence [Urinary Retention] : urinary retention [None] : None

## 2023-02-07 LAB — PSA SERPL-MCNC: <0.01 NG/ML

## 2023-02-08 ENCOUNTER — NON-APPOINTMENT (OUTPATIENT)
Age: 83
End: 2023-02-08

## 2023-02-10 ENCOUNTER — RX RENEWAL (OUTPATIENT)
Age: 83
End: 2023-02-10

## 2023-05-07 ENCOUNTER — RX RENEWAL (OUTPATIENT)
Age: 83
End: 2023-05-07

## 2023-06-07 NOTE — BRIEF OPERATIVE NOTE - NSICDXBRIEFPROCEDURE_GEN_ALL_CORE_FT
PROCEDURES:  Robot-assisted repair of right inguinal hernia using da Caio Xi 12-Apr-2021 18:13:15  Jodie Naqvi  
None known

## 2023-07-10 ENCOUNTER — RX RENEWAL (OUTPATIENT)
Age: 83
End: 2023-07-10

## 2023-07-10 RX ORDER — FINASTERIDE 5 MG/1
5 TABLET, FILM COATED ORAL DAILY
Qty: 90 | Refills: 3 | Status: ACTIVE | COMMUNITY
Start: 2023-07-10 | End: 1900-01-01

## 2023-07-31 ENCOUNTER — APPOINTMENT (OUTPATIENT)
Dept: HEART AND VASCULAR | Facility: CLINIC | Age: 83
End: 2023-07-31
Payer: MEDICARE

## 2023-07-31 VITALS
DIASTOLIC BLOOD PRESSURE: 72 MMHG | HEIGHT: 64 IN | OXYGEN SATURATION: 96 % | BODY MASS INDEX: 23.9 KG/M2 | HEART RATE: 63 BPM | WEIGHT: 140 LBS | RESPIRATION RATE: 16 BRPM | TEMPERATURE: 98.4 F | SYSTOLIC BLOOD PRESSURE: 168 MMHG

## 2023-07-31 PROCEDURE — 93000 ELECTROCARDIOGRAM COMPLETE: CPT

## 2023-07-31 PROCEDURE — 99213 OFFICE O/P EST LOW 20 MIN: CPT

## 2023-07-31 PROCEDURE — G0405: CPT

## 2023-07-31 RX ORDER — AZITHROMYCIN 250 MG/1
250 TABLET, FILM COATED ORAL
Qty: 6 | Refills: 1 | Status: DISCONTINUED | COMMUNITY
Start: 2023-01-20 | End: 2023-07-31

## 2023-07-31 RX ORDER — BENZONATATE 200 MG/1
200 CAPSULE ORAL
Qty: 21 | Refills: 1 | Status: DISCONTINUED | COMMUNITY
Start: 2023-01-20 | End: 2023-07-31

## 2023-07-31 NOTE — HISTORY OF PRESENT ILLNESS
[Preoperative Visit] : for a medical evaluation prior to surgery [Scheduled Procedure ___] : a [unfilled] [Date of Surgery ___] : on [unfilled] [Surgeon Name ___] : surgeon: [unfilled] [de-identified] : Tel: [phone], Fax: [phone] [FreeTextEntry1] : - Dr Quiroga in [place] retired,  [place]\par  Derm- Dr Navin Muniz in [place]  [phone]

## 2023-07-31 NOTE — ASSESSMENT
[FreeTextEntry1] : ASHD- s/p multiple stents, pain free, walks 2-3 miles/day.  OK in cold weather, still doing well but walking less  LE edema- recent labs at  were good, BP is up off quinapril and there is moderate edema, will start Lasix 20 mg and K 10 mEq MWF.  Edema resolved, off Q, lasix and K. 1+ edema is back  HLD- Lipids at goal 11/2016, rechecked and excellent 7/2018, repeated 7/2019, excellent.  Now getting labs at   HTN- was excellent, off Quinipril, now up and having LE edema.  Will consider diuretic if it stays up.  Large right inguinal hernia- referred to surgery, I can not reduce it as it just pops right back out. s/p herniorrhaphy  Hematuria- Admitted to [facility] 5/9/20 with urinary retention with h/o Brachytherapy from 15 y/a.  Plavix and Quinipril DCed.  Had Ruiz Bag. Saw Dr Preciado who dx a bladder outlet obstruction.  Keratoses- handed a referrel to Derm, lives in [place], he will see someone in [place]. Has not gone as of 1/23/18, finally he went to Dr Navin Muniz, had several skin cancers removed from face and scalp.  h/o Prostate CA- needs f/u with Urology, Dr Quiroga retired,  Told to see a new Urologist, PSA sent. Zero.  Followed at [facility]. Saw Dr Preciado who dx a bladder outlet obstruction.  Prediabetes- A1c 5.9, diet better, 5.8. 6.3. Labs next visit. Cough- steroid inhaler given

## 2023-07-31 NOTE — REASON FOR VISIT
[Follow-Up - Clinic] : a clinic follow-up of [Coronary Artery Disease] : coronary artery disease [Hyperlipidemia] : hyperlipidemia [Hypertension] : hypertension [FreeTextEntry1] : s/p multiple stents, last one 11/3/16(pLAD) after presenting with CP and a very abnormal nuclear stress test.  Currently pain free. Walks a lot even in cold weather 1/27/20  Dx with DJD of right hip and osteopenia 6/12/20 Admitted to [facility] with Hematuria 2/T prior brachytherapy.  It  started May 9th.  Plavix stopped along with Quinipril.  Received a Tx 8/3/20  No more blood but + urinary retention requiring 2 visits to [facility].  Ruiz in, developed LE edema.  Labs in June were good. 12/11/20  Self catheterized for urination.  Had labs Sept at [place] 3/4/21 Got first Covid Vaccine, has a new right inguinal bulge present x 1 week according to the pt.  Not painful 1/20/23  Went to Urgent Care with URI, PCR (-), Given Doxy, Medrol dose Artem and Benzonatate.  Had (-) PCR.Had flu shot and Covid booster Sept 29th 4/1/21 Preop for hernia 7/20/22 Hernia went well.  Had URI with a lot of secretions and coughing.  Pharmacist gave him expectorant and cough med. Moderately better. 7/31/23 no new c/o.  He came her by subway and walking.  EKG: NSR, frequent APCs,  normal axis and intervals, no ST-Tw abnormalities. 3/4/21

## 2023-10-01 PROBLEM — Z92.3 HISTORY OF RADIATION THERAPY: Status: ACTIVE | Noted: 2021-03-25

## 2023-11-14 NOTE — ASSESSMENT
[FreeTextEntry1] : ASHD- s/p multiple stents, pain free, walks 2-3 miles/day.  OK in cold weather, still doing well\par \par LE edema- recent labs at  were good, BP is up off quinapril and there is moderate edema, will start Lasix 20 mg and K 10 mEq MWF\par \par HLD- Lipids at goal 11/2016,   rechecked and excellent 7/2018, repeated 7/2019, excellent\par \par HTN- wasexcellent, off Quinipril, now up and having LE edema\par \par Hematuria- Admitted to [place] 5/9/20 with Brachytherapy from 15 y/a.  Plavix and Quinipril DCed.  Has Ruiz Bag.\par \par Keratoses- handed a referrel to Derm, lives in [place], he will see someone in [place]. Has not gone as of 1/23/18, finally he went to Dr Navin Muniz,had several skin cancers removed from face and scalp.\par \par h/o Prostate CA- needs f/u with Urology, Dr Quiroga retired,  Told to see a new Urologist, PSA sent. Zero\par \par Prediabetes- A1c 5.9, diet better, 5.8
Statement Selected

## 2023-12-06 NOTE — HISTORY OF PRESENT ILLNESS
[FreeTextEntry1] : - Dr Quiroga in [place] retired, \par Derm- Dr Navin Muniz in [place]  [phone] Statement Selected

## 2024-01-31 ENCOUNTER — APPOINTMENT (OUTPATIENT)
Dept: HEART AND VASCULAR | Facility: CLINIC | Age: 84
End: 2024-01-31
Payer: MEDICARE

## 2024-01-31 VITALS
WEIGHT: 136 LBS | BODY MASS INDEX: 23.22 KG/M2 | SYSTOLIC BLOOD PRESSURE: 150 MMHG | HEART RATE: 73 BPM | DIASTOLIC BLOOD PRESSURE: 74 MMHG | OXYGEN SATURATION: 95 % | TEMPERATURE: 97.9 F | HEIGHT: 64 IN

## 2024-01-31 VITALS — DIASTOLIC BLOOD PRESSURE: 64 MMHG | SYSTOLIC BLOOD PRESSURE: 134 MMHG

## 2024-01-31 DIAGNOSIS — I10 ESSENTIAL (PRIMARY) HYPERTENSION: ICD-10-CM

## 2024-01-31 DIAGNOSIS — I25.10 ATHEROSCLEROTIC HEART DISEASE OF NATIVE CORONARY ARTERY W/OUT ANGINA PECTORIS: ICD-10-CM

## 2024-01-31 DIAGNOSIS — R73.03 PREDIABETES.: ICD-10-CM

## 2024-01-31 DIAGNOSIS — E78.5 HYPERLIPIDEMIA, UNSPECIFIED: ICD-10-CM

## 2024-01-31 PROCEDURE — 99214 OFFICE O/P EST MOD 30 MIN: CPT

## 2024-01-31 PROCEDURE — G2211 COMPLEX E/M VISIT ADD ON: CPT

## 2024-01-31 PROCEDURE — 36415 COLL VENOUS BLD VENIPUNCTURE: CPT

## 2024-01-31 PROCEDURE — G0009: CPT

## 2024-01-31 PROCEDURE — 90732 PPSV23 VACC 2 YRS+ SUBQ/IM: CPT

## 2024-01-31 NOTE — REASON FOR VISIT
[Follow-Up - Clinic] : a clinic follow-up of [Coronary Artery Disease] : coronary artery disease [Hyperlipidemia] : hyperlipidemia [Hypertension] : hypertension [FreeTextEntry1] : s/p multiple stents, last one 11/3/16(pLAD) after presenting with CP and a very abnormal nuclear stress test.  Currently pain free. Walks a lot even in cold weather 1/27/20  Dx with DJD of right hip and osteopenia 6/12/20 Admitted to [facility] with Hematuria 2/T prior brachytherapy.  It  started May 9th.  Plavix stopped along with Quinipril.  Received a Tx 8/3/20  No more blood but + urinary retention requiring 2 visits to [facility].  Ruiz in, developed LE edema.  Labs in June were good. 12/11/20  Self catheterized for urination.  Had labs Sept at [place] 3/4/21 Got first Covid Vaccine, has a new right inguinal bulge present x 1 week according to the pt.  Not painful 1/20/23  Went to Urgent Care with URI, PCR (-), Given Doxy, Medrol dose Artem and Benzonatate.  Had (-) PCR.Had flu shot and Covid booster Sept 29th 4/1/21 Preop for hernia 7/20/22 Hernia went well.  Had URI with a lot of secretions and coughing.  Pharmacist gave him expectorant and cough med. Moderately better. 7/31/23 no new c/o.  He came her by subway and walking. 1/31/24 Needs Pneumovax today and labs, last A1c 6.3 in 3023  EKG: NSR, frequent APCs,  normal axis and intervals, no ST-Tw abnormalities. 3/4/21

## 2024-01-31 NOTE — ASSESSMENT
[FreeTextEntry1] : ASHD- s/p multiple stents, pain free, walks 2-3 miles/day.  OK in cold weather, still doing well but walking less  LE edema- recent labs at  were good, BP is up off quinapril and there is moderate edema, will start Lasix 20 mg and K 10 mEq MWF.  Edema resolved, off Q, lasix and K. Trace to 1+ edema is back  Health Maintenance- Had Prevnar 1/2023 and got Pneumonvax 23 1/31/24  HLD- Lipids at goal 11/2016, rechecked and excellent 7/2018, repeated 7/2019, excellent.  Now getting labs at . Labs were drawn today in Office.   HTN- was excellent, off Quinipril, now up and having LE edema.  Will consider diuretic if it stays up. BP OK  Hematuria- Admitted to [place] 5/9/20 with urinary retention with h/o Brachytherapy from 15 y/a.  Plavix and Quinipril DCed.  Had Ruiz Bag. Saw Dr Preciado who dx a bladder outlet obstruction. h/o Prostate CA- needs f/u with Urology, Dr Quiroga retired,   No longer followed at [facility]. Saw Dr Preciado who dx a bladder outlet obstruction. Will f/u with Dr Galo  Prediabetes- A1c 5.9, diet better, 5.8. 6.3. Labs next visit.  Keratoses- handed a referral to Derm, lives in [place], he will see someone in [place]. Has not gone as of 1/23/18, finally he went to Dr Navin Muniz, had several skin cancers removed from face and scalp. Cough- steroid inhaler given

## 2024-01-31 NOTE — HISTORY OF PRESENT ILLNESS
[FreeTextEntry1] : - Dr Quiroga in [place] retired,  Paresh, Dr Clover Cardeans- Dr Navin Muniz in [place]  [phone]  Has a neice and nephew.  Has a group of 6 or 7 friends.

## 2024-02-01 LAB
ALBUMIN SERPL ELPH-MCNC: 3.9 G/DL
ALP BLD-CCNC: 110 U/L
ALT SERPL-CCNC: 12 U/L
ANION GAP SERPL CALC-SCNC: 14 MMOL/L
AST SERPL-CCNC: 15 U/L
BASOPHILS # BLD AUTO: 0.04 K/UL
BASOPHILS NFR BLD AUTO: 0.4 %
BILIRUB SERPL-MCNC: 0.7 MG/DL
BUN SERPL-MCNC: 32 MG/DL
CALCIUM SERPL-MCNC: 9.2 MG/DL
CHLORIDE SERPL-SCNC: 107 MMOL/L
CHOLEST SERPL-MCNC: 129 MG/DL
CO2 SERPL-SCNC: 23 MMOL/L
CREAT SERPL-MCNC: 1.03 MG/DL
EGFR: 72 ML/MIN/1.73M2
EOSINOPHIL # BLD AUTO: 0.12 K/UL
EOSINOPHIL NFR BLD AUTO: 1.2 %
ESTIMATED AVERAGE GLUCOSE: 126 MG/DL
GLUCOSE SERPL-MCNC: 118 MG/DL
HBA1C MFR BLD HPLC: 6 %
HCT VFR BLD CALC: 42.9 %
HDLC SERPL-MCNC: 59 MG/DL
HGB BLD-MCNC: 13.2 G/DL
IMM GRANULOCYTES NFR BLD AUTO: 0.6 %
LDLC SERPL CALC-MCNC: 58 MG/DL
LYMPHOCYTES # BLD AUTO: 0.95 K/UL
LYMPHOCYTES NFR BLD AUTO: 9.5 %
MAN DIFF?: NORMAL
MCHC RBC-ENTMCNC: 30.1 PG
MCHC RBC-ENTMCNC: 30.8 GM/DL
MCV RBC AUTO: 97.7 FL
MONOCYTES # BLD AUTO: 0.77 K/UL
MONOCYTES NFR BLD AUTO: 7.7 %
NEUTROPHILS # BLD AUTO: 8.06 K/UL
NEUTROPHILS NFR BLD AUTO: 80.6 %
NONHDLC SERPL-MCNC: 70 MG/DL
PLATELET # BLD AUTO: 247 K/UL
POTASSIUM SERPL-SCNC: 4.4 MMOL/L
PROT SERPL-MCNC: 6.5 G/DL
RBC # BLD: 4.39 M/UL
RBC # FLD: 13.1 %
SODIUM SERPL-SCNC: 144 MMOL/L
TRIGL SERPL-MCNC: 60 MG/DL
WBC # FLD AUTO: 10 K/UL

## 2024-02-05 ENCOUNTER — APPOINTMENT (OUTPATIENT)
Dept: UROLOGY | Facility: CLINIC | Age: 84
End: 2024-02-05

## 2024-02-08 ENCOUNTER — APPOINTMENT (OUTPATIENT)
Dept: UROLOGY | Facility: CLINIC | Age: 84
End: 2024-02-08
Payer: MEDICARE

## 2024-02-08 VITALS
HEIGHT: 64 IN | SYSTOLIC BLOOD PRESSURE: 116 MMHG | WEIGHT: 150 LBS | HEART RATE: 75 BPM | TEMPERATURE: 97.2 F | BODY MASS INDEX: 25.61 KG/M2 | OXYGEN SATURATION: 96 % | DIASTOLIC BLOOD PRESSURE: 68 MMHG

## 2024-02-08 PROCEDURE — 51798 US URINE CAPACITY MEASURE: CPT

## 2024-02-08 PROCEDURE — 99213 OFFICE O/P EST LOW 20 MIN: CPT

## 2024-02-08 NOTE — ASSESSMENT
[FreeTextEntry1] : Diagnosis: Prostate Cancer  Plan: Bladder scan=35 cc, no acute distress noted Cr. stable Discussed false passage vs stricture as reason patient unable to get return of urine when he is passing the catheter. Plan on follow up with Dr. Londono for possible cystosopcy Reviewed s/s to monitor for if unable to void at all or becomes uncomfortable must call office PSA today  Tl Galo MD, FACS, FRCS  of Urology [facility] Director of Laparoscopic and Robotic Surgery [facility] Director of Urology, [facility] Professor of Urology   (Office) [phone] (Cell)  [phone] [email or web address] The total amount of time I have personally spent preparing for this visit, reviewing the patient's test results, obtaining external history, ordering tests/medications, documenting clinical information, communicating with and counseling the patient/family and/or caregiver(s), and spent face to face with the patient explaining the above was minutes =20

## 2024-02-08 NOTE — HISTORY OF PRESENT ILLNESS
[FreeTextEntry1] : This is an 80 year old male with history of prostate cancer in 2005, treated with seeds who developed gross hematuria in May 2020. 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 His blood thinners were stopped after patient continued to have 6-8 weeks of intermittent hematuria.  Followed up with the urology clinic at [place] and had multiple failed voiding trials. Eventually cystoscopy was performed in September 2020, patient does not report any abnormal findings.  He was told after the cysto that he would need to perform CIC. He currently catheterizes with a 14 Fr coude catheter 2 x day.  He does keep a voiding diary. In the morning he reports an urge to urinate but cant and needs to catheterize. He records volumes between 400-500 cc each time he catheterizes. After the initial CIC he is able to voiding through out the day and records voided volumes of ~150 cc.  Currently on Finasteride and Flomax  Denies any hematuria or dysuria  Dr. John Villatoro will be doing hernia repair in the coming future.  Surgical Hx: cardiac stents x 5 Social Hx: former smoker ( 40 year ppd smoking history), no ETOH, retired  Family Hx: sister with lung Cancer  5/26/21 Here for f/u. 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 He has returned to doing CIC once a day, voiding the rest of the day. PVR today with CIC = 600cc this morning. He has no complaints  7/28/21 Here for f/u. Doing well. He is doing CIC 1-2x/day, 500cc. He voids the rest of the day, 150-200cc each time, no straining. No hematuria.  11/22/21: Here for f/u. Doing well. CIC 1-2x/day, between 200-500ml. He has not done it in 4 days as he has been able to void. When he is able to void, he has some intermittent straining. No hematuria. Still taking tamsulosin & finasteride. Nocturia x1/night.  2/06/23 Here for f/u. Doing CIC 2x day. Had one episode of hematuria with catheterizing last week when he stated he had some difficulty catheterizing. No further hematuria or difficulty. Also voids intermittently. Last PSA 11/2021 undetectable. Last creatinine 7/2022 = 1.0  2/8/24-For the past 2 weeks has been performing CIC but no urine coming out.  He is able to void independently but reports increase in frequency of urination since he is unable to pass catheter.  Wearing depends and states significant leakage or urine on depends. Denies any fever/chills or hematuria but reports " gel like" consistency coming out of penis. Continues with tamsulosin and finasteride Cr.-1.03 on 1/2024. PSA< 0.01 ng/mL-2/2023 PVR 35 cc today

## 2024-02-08 NOTE — PHYSICAL EXAM
[General Appearance - Well Developed] : well developed [General Appearance - Well Nourished] : well nourished [Normal Appearance] : normal appearance [Well Groomed] : well groomed [General Appearance - In No Acute Distress] : no acute distress [] : no respiratory distress [No Focal Deficits] : no focal deficits [Oriented To Time, Place, And Person] : oriented to person, place, and time

## 2024-02-09 LAB — PSA SERPL-MCNC: <0.01 NG/ML

## 2024-02-13 ENCOUNTER — RX RENEWAL (OUTPATIENT)
Age: 84
End: 2024-02-13

## 2024-02-13 RX ORDER — METOPROLOL SUCCINATE 50 MG/1
50 TABLET, EXTENDED RELEASE ORAL
Qty: 90 | Refills: 3 | Status: ACTIVE | COMMUNITY
Start: 2017-10-25 | End: 1900-01-01

## 2024-02-16 ENCOUNTER — APPOINTMENT (OUTPATIENT)
Dept: UROLOGY | Facility: CLINIC | Age: 84
End: 2024-02-16
Payer: MEDICARE

## 2024-02-16 PROCEDURE — 52000 CYSTOURETHROSCOPY: CPT

## 2024-02-16 PROCEDURE — 99215 OFFICE O/P EST HI 40 MIN: CPT | Mod: 25

## 2024-02-27 NOTE — HISTORY OF PRESENT ILLNESS
[FreeTextEntry1] : Language: English Accompanied by: Friend Contact info: [phone] Referring Urologist: Clover PMD: Jw Orellana    Initial H&P 02/16/2024: Mr. MUÑOZ is a very pleasant 83-year-old gentleman who presents today for initial evaluation of urethral stricture.   He was initially seen by Dr. Preciado in 2021.  He has a history of prostate cancer in 2005, treated with seeds who developed gross hematuria in May 2020. 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 His blood thinners were stopped after patient continued to have 6-8 weeks of intermittent hematuria.  Followed up with the urology clinic at [place] and had multiple failed voiding trials. Eventually cystoscopy was performed in September 2020, patient does not report any abnormal findings.  He was told after the cysto that he would need to perform CIC.Was then performing CIC with 14Fr coude BID.   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  He had been doing CIC BID until approx 1 month ago, when he noticed that every time he performs CIC, no urine comes out. Has been voiding independently, but did report increased frequency and significant leakage.   Also has prior history of UDS with Dr. Sadler in 2021, which showed outlet obstruction and small capacity bladder.  --------------------------------------------------------------------------------------------------------------------------------------- Surgical Hx: cardiac stents x 5 Social Hx: former smoker ( 40 year ppd smoking history), no ETOH, retired  Family Hx: sister with lung Cancer --------------------------------------------------------------------------------------------------------------------------------------- Physical Exam: General: NAD, sitting on exam table comfortably HEENT: NCAT, EOMI Resp: breathing comfortably on RA, b/l symm chest rise Cardiac: RRR Abd: SNTND Back: (-) CVAT b/l MSK: AFTAB gonzales/ FROM Psych: appropriate affect : normal appearing uncircumcised phallus, meatus WNL,  b/l desc testes, no testicular masses or lesions, b/l vas palpable --------------------------------------------------------------------------------------------------------------------------------------- Results:   --------------------------------------------------------------------------------------------------------------------------------------- Procedures: Cystoscopy 02/16/2024: Informed consent was obtained. Patient's allergies were verified. 500mg Ciprofloxacin was given for periprocedural prophylaxis. Pt was then prepped and draped in the usual sterile fashion with Betadine. Timeout was performed by me. 5cc of 2% urojet was instilled per urethra. A 16.2 Lao disposable Ambu scope was inserted per urethra. Findings were: Anterior Urethra: ~12Fr dense stricture seen in the proximal bulbar/bulbomembranous urethra - membranous and prostatic urethra were seen through the lumen of the stricture, which appeared to be continuous all the way to the proximal extent that was visible on cystoscopy.  Scope could not be advanced beyond the proximal bulbar urethra.  Following cystoscopy, a 14Fr stiff coude catheter was passed into the bladder with mild resistance noted at the level of the narrowing.  Mild and gentle steady pressure was applied at this point and catheter was able to be advanced all the way in.  There was immediate return of approximtely 25cc of concentrated yellow urine mixed with debris.  --------------------------------------------------------------------------------------------------------------------------------------- A/P: 83M with history of prostate cancer s/p brachytherapy, gross hematuria likely 2/2 radiation cystitis, now with urethral stricture CIC dependent with 14Fr coude catheter.   1. Urethral Stricture We had a long conversation today re his urethral stricture management. I explained that he has a long stricture that starts in his proximal bulbar urethra and extends more proximally into his prostatic urethra.  Without a retrograde urethrogram, I do not know the total extent/length of his stricture, however I was able to insert a 14Fr coude catheter all the way into his bladder with minimal resistance. There was no false passage.  I explained that his red rubber catheters that he uses are likely too flexible, and given presence of scar, he requires a stiffer catheter to be able to insert it all the way in, which is the catheter that I used today in the office.   We discussed the various management options, particularly continuing CIC,  endoscopic management of his stricture and open reconstruction.  With regards to endoscopic management, I explained that this is the less invasive option between the two surgical ones.  We discussed that endoscopic management typically involves a technique that would require us to "open" the scar tissue in some fashion, followed by an indwelling urethral catheter to allow the area to heal for a period of 2-3d.  Additionally, I explained that there is now a newer option involving delivery of a chemotherapeutic agent directly to the dilated area after the initial scar tissue is incised/dilated. The method of delivery would be inflation of a special balloon (optilume) that is coated with the necessary medicine.  This does not add additional complexity to the case, and the idea behind this new agent is to prevent or at least slow down stricture recurrence following initial dilation.  I further explained that endoscopic management alone, particularly in a radiated field, has a very high recurrence rate, and if we were to simply dilate or cut the scar without any additional maneuvers, there is a very high chance that the scar tissue would recur within the first year of dilation.  Furthermore, I explained that the Optilume balloon is not currently FDA approved for the posterior urethra.  The reason for this is that the original clinical trials were performed on the anterior urethra, which is anatomically distinct from the posterior urethra.  The anterior urethra, long-term data on success rate beyond 3 years does not currently exist.  For the posterior urethra, there is no robust data on long-term patency.  Nonetheless, given his age and medical comorbidities, if he was to proceed with endoscopic management, I would recommend utilization of the Optilume balloon to maximize his chances of long-term patency.  We also discussed open reconstruction. I explained that urethral strictures that are found in the bulbar urethra often can be treated with excision and primary anastomosis urethroplasty (EPA), while pendulous strictures, as well as posterior urethral stricture, may necessitate buccal mucosa grafting. In regards to most urethroplasty I perform, the patient understands that a Ruiz catheter will be in place for a period of roughly 3 weeks following surgery and that a VCUG imaging study will need to be obtained at the time of catheter removal to ensure adequate urethral patency.  In regards to buccal mucosa harvesting, I have discussed with the patient that I have received specialty training in buccal mucosa grafting and that dietary restrictions must be followed in the immediate postoperative period. Often, patients receiving buccal mucosa grafts appear to have more pain from the buccal mucosa site than from the actual urethroplasty incision. Regardless, the patient understands that with greater usage of buccal mucosa for the urethral defect, a greater chance of urethroplasty failure exists. At the time of surgical dissection, the patient understands there is a small-but-not-zero chance of injury to the Stensens duct (which may be recognized or un-recognized at the time of urethroplasty). Oral surgeons often perform correction of such Stensens duct injuries.  The patient has also been counseled on the risks of urethroplasty failure or overall adverse sequelae including but not limited to recurrent stricture disease, fistula formation, stenosis, pain, recurrent urinary tract infection, or surgical site infections. In patients with proximal strictures without suprapubic tubes prior to surgery, patients may need suprapubic access to the bladder at the time of urethroplasty that may facilitate operative correction. For proximal urethral strictures in particular, the patient faces a less than 5% chance of erectile dysfunction but an approximately 10-20% chance of provoking stress urinary incontinence. Although erectile dysfunction from urethroplasty may be treated with oral medications, stress incontinence may warrant placement of an artificial urinary sphincter.  I also explained that if he were to proceed with open reconstruction, he would need to undergo RUG/VCUG first to assess the total length as well as proximal extent of his stricture.    Lastly, we discussed continuation of CIC.  I explained that this is certainly a feasible option if he does not want to proceed with surgery, however over time, it may become more and more difficult for him to catheterize through a scarred urethra, and if he requires surgical management in the future, it may add further complexity to the case. Additionally, he may be diagnosed with additional medical comorbidities in the future, which may limit his surgical options.   He will consider his options and notify me in the coming days on his final decision. I have provided him and his friend with my personal cell, and they will call me with an answer.   2. Chronic Retention For the time being, given the ease with which I was able to insert the catheter, I recommended we switch his catheters to the stiffer coude catheters. I provided him with several samples from the office and will place an order for the new catheters.   3. Prostate Cancer Most recent PSA undetectable. Continue annual PSA checks.   4. Radiation Cystitis Currently without Gross hematuria.  Will continue to monitor for recurrence, and if continues to have intermittent bleeding, may need additional treatments based on severity of bleeding and findings at the time of further evaluation.   Plan: - pt to call with surgical decision - cont current CIC regimen (3x/day) with new catheters - RTC TBD  In addition to today's procedure, I spent an additional 50 minutes on face-to-face counseling, coordination of care and clinical documentation.

## 2024-03-13 ENCOUNTER — RX RENEWAL (OUTPATIENT)
Age: 84
End: 2024-03-13

## 2024-03-13 RX ORDER — PRAVASTATIN SODIUM 40 MG/1
40 TABLET ORAL
Qty: 90 | Refills: 3 | Status: ACTIVE | COMMUNITY
Start: 2018-02-02 | End: 1900-01-01

## 2024-05-01 ENCOUNTER — RX RENEWAL (OUTPATIENT)
Age: 84
End: 2024-05-01

## 2024-05-01 RX ORDER — TAMSULOSIN HYDROCHLORIDE 0.4 MG/1
0.4 CAPSULE ORAL
Qty: 90 | Refills: 3 | Status: ACTIVE | COMMUNITY
Start: 2022-05-16 | End: 1900-01-01

## 2024-07-24 ENCOUNTER — APPOINTMENT (OUTPATIENT)
Dept: HEART AND VASCULAR | Facility: CLINIC | Age: 84
End: 2024-07-24
Payer: MEDICARE

## 2024-07-24 ENCOUNTER — NON-APPOINTMENT (OUTPATIENT)
Age: 84
End: 2024-07-24

## 2024-07-24 VITALS
BODY MASS INDEX: 23.4 KG/M2 | TEMPERATURE: 97.6 F | SYSTOLIC BLOOD PRESSURE: 152 MMHG | WEIGHT: 137.04 LBS | OXYGEN SATURATION: 97 % | HEIGHT: 64 IN | DIASTOLIC BLOOD PRESSURE: 68 MMHG | HEART RATE: 64 BPM

## 2024-07-24 DIAGNOSIS — I10 ESSENTIAL (PRIMARY) HYPERTENSION: ICD-10-CM

## 2024-07-24 DIAGNOSIS — E78.5 HYPERLIPIDEMIA, UNSPECIFIED: ICD-10-CM

## 2024-07-24 DIAGNOSIS — R73.03 PREDIABETES.: ICD-10-CM

## 2024-07-24 DIAGNOSIS — I25.10 ATHEROSCLEROTIC HEART DISEASE OF NATIVE CORONARY ARTERY W/OUT ANGINA PECTORIS: ICD-10-CM

## 2024-07-24 PROCEDURE — 99213 OFFICE O/P EST LOW 20 MIN: CPT

## 2024-07-24 PROCEDURE — 93000 ELECTROCARDIOGRAM COMPLETE: CPT

## 2024-07-24 NOTE — PHYSICAL EXAM
[General Appearance - Well Developed] : well developed [Normal Appearance] : normal appearance [Well Groomed] : well groomed [General Appearance - Well Nourished] : well nourished [No Deformities] : no deformities [General Appearance - In No Acute Distress] : no acute distress [Respiration, Rhythm And Depth] : normal respiratory rhythm and effort [Exaggerated Use Of Accessory Muscles For Inspiration] : no accessory muscle use [Auscultation Breath Sounds / Voice Sounds] : lungs were clear to auscultation bilaterally [Heart Sounds] : normal S1 and S2 [Heart Rate And Rhythm] : heart rate and rhythm were normal [Murmurs] : no murmurs present [Abdomen Soft] : soft [Abdomen Tenderness] : non-tender [Abdomen Mass (___ Cm)] : no abdominal mass palpated [Abnormal Walk] : normal gait [Gait - Sufficient For Exercise Testing] : the gait was sufficient for exercise testing [Nail Clubbing] : no clubbing of the fingernails [Cyanosis, Localized] : no localized cyanosis [Petechial Hemorrhages (___cm)] : no petechial hemorrhages [Skin Color & Pigmentation] : normal skin color and pigmentation [] : no rash [No Venous Stasis] : no venous stasis [No Skin Ulcers] : no skin ulcer [No Xanthoma] : no  xanthoma was observed [Normal Conjunctiva] : the conjunctiva exhibited no abnormalities [Eyelids - No Xanthelasma] : the eyelids demonstrated no xanthelasmas [Oriented To Time, Place, And Person] : oriented to person, place, and time [Affect] : the affect was normal [Mood] : the mood was normal [No Anxiety] : not feeling anxious [FreeTextEntry1] : dentures

## 2024-07-24 NOTE — HISTORY OF PRESENT ILLNESS
[FreeTextEntry1] : - Dr Quiroga in [place] retired,  Paresh, Dr Clover Cardenas- Dr Navin Muniz in [place]  [phone]  Has a neice and nephew (Og Ramirez).  Has a group of 6 or 7 friends.

## 2024-07-24 NOTE — REASON FOR VISIT
[Follow-Up - Clinic] : a clinic follow-up of [Coronary Artery Disease] : coronary artery disease [Hyperlipidemia] : hyperlipidemia [Hypertension] : hypertension [FreeTextEntry1] : s/p multiple stents, last one 11/3/16(pLAD) after presenting with CP and a very abnormal nuclear stress test.  Currently pain free. Walks a lot even in cold weather 1/27/20  Dx with DJD of right hip and osteopenia 6/12/20 Admitted to [facility] with Hematuria 2/T prior brachytherapy.  It  started May 9th.  Plavix stopped along with Quinipril.  Received a Tx 8/3/20  No more blood but + urinary retention requiring 2 visits to [facility].  Ruiz in, developed LE edema.  Labs in June were good. 12/11/20  Self catheterized for urination.  Had labs Sept at [place] 3/4/21 Got first Covid Vaccine, has a new right inguinal bulge present x 1 week according to the pt.  Not painful 1/20/23  Went to Urgent Care with URI, PCR (-), Given Doxy, Medrol dose Artem and Benzonatate.  Had (-) PCR.Had flu shot and Covid booster Sept 29th 4/1/21 Preop for hernia 7/20/22 Hernia went well.  Had URI with a lot of secretions and coughing.  Pharmacist gave him expectorant and cough med. Moderately better. 7/31/23 no new c/o.  He came her by subway and walking. 1/31/24 Needs Pneumovax today and labs, last A1c 6.3 in 2023 7/24/24  EKG: NSR, frequent APCs,  normal axis and intervals, no ST-Tw abnormalities. 3/4/21

## 2024-07-24 NOTE — ASSESSMENT
[FreeTextEntry1] : ASHD- s/p multiple stents, pain free, walks 2-3 miles/day.  OK in cold weather, still doing well but walking less.  EKG stable 7/2024  LE edema- recent labs at  were good, BP is up off quinapril and there is moderate edema, will start Lasix 20 mg and K 10 mEq MWF.  Edema resolved, off Q, lasix and K. Trace to 1+ edema is back  Health Maintenance- Had Prevnar 1/2023 and got Pneumonvax 23 1/31/24  HLD- Lipids at goal 11/2016, rechecked and excellent 7/2018, repeated 7/2019, excellent.  Now getting labs at . Labs were drawn today in Office.   HTN- was excellent, off Quinipril, now up and having LE edema.  Will consider diuretic if it stays up. BP up today.  but will wait until next visit before adding more meds, BP was excellent last visit.  Hematuria- Admitted to [facility] 5/9/20 with urinary retention with h/o Brachytherapy from 15 y/a.  Plavix and Quinipril DCed.  Had Ruiz Bag. Saw Dr Preciado who dx a bladder outlet obstruction. h/o Prostate CA- needs f/u with Urology, Dr Quiroga retired,   No longer followed at [facility]. Saw Dr Preciado who dx a bladder outlet obstruction. Will f/u with Dr Galo  Prediabetes- A1c 5.9, diet better, 5.8. 6.3. Labs next visit.  Keratoses- handed a referral to Ronald, lives in [place], he will see someone in [place]. Has not gone as of 1/23/18, finally he went to Dr Navin Muniz, had several skin cancers removed from face and scalp. Cough- steroid inhaler given

## 2025-02-06 ENCOUNTER — RX RENEWAL (OUTPATIENT)
Age: 85
End: 2025-02-06

## 2025-02-28 ENCOUNTER — APPOINTMENT (OUTPATIENT)
Dept: UROLOGY | Facility: CLINIC | Age: 85
End: 2025-02-28
Payer: MEDICARE

## 2025-02-28 ENCOUNTER — NON-APPOINTMENT (OUTPATIENT)
Age: 85
End: 2025-02-28

## 2025-02-28 VITALS
DIASTOLIC BLOOD PRESSURE: 76 MMHG | OXYGEN SATURATION: 92 % | HEART RATE: 76 BPM | SYSTOLIC BLOOD PRESSURE: 151 MMHG | TEMPERATURE: 98.7 F

## 2025-02-28 PROCEDURE — G2211 COMPLEX E/M VISIT ADD ON: CPT

## 2025-02-28 PROCEDURE — 99213 OFFICE O/P EST LOW 20 MIN: CPT

## 2025-03-06 ENCOUNTER — RX RENEWAL (OUTPATIENT)
Age: 85
End: 2025-03-06

## 2025-04-23 ENCOUNTER — RX RENEWAL (OUTPATIENT)
Age: 85
End: 2025-04-23

## 2025-06-10 ENCOUNTER — RX RENEWAL (OUTPATIENT)
Age: 85
End: 2025-06-10

## 2025-06-19 ENCOUNTER — NON-APPOINTMENT (OUTPATIENT)
Age: 85
End: 2025-06-19

## 2025-06-19 ENCOUNTER — APPOINTMENT (OUTPATIENT)
Dept: HEART AND VASCULAR | Facility: CLINIC | Age: 85
End: 2025-06-19
Payer: MEDICARE

## 2025-06-19 VITALS
HEIGHT: 64 IN | HEART RATE: 88 BPM | WEIGHT: 134.01 LBS | SYSTOLIC BLOOD PRESSURE: 146 MMHG | BODY MASS INDEX: 22.88 KG/M2 | OXYGEN SATURATION: 95 % | TEMPERATURE: 97.9 F | DIASTOLIC BLOOD PRESSURE: 68 MMHG

## 2025-06-19 PROCEDURE — 36415 COLL VENOUS BLD VENIPUNCTURE: CPT

## 2025-06-19 PROCEDURE — 99214 OFFICE O/P EST MOD 30 MIN: CPT

## 2025-06-19 PROCEDURE — 93000 ELECTROCARDIOGRAM COMPLETE: CPT

## 2025-06-19 RX ORDER — FUROSEMIDE 20 MG/1
20 TABLET ORAL
Qty: 40 | Refills: 3 | Status: ACTIVE | COMMUNITY
Start: 2025-06-19 | End: 1900-01-01

## 2025-06-19 RX ORDER — LOSARTAN POTASSIUM 25 MG/1
25 TABLET, FILM COATED ORAL
Qty: 90 | Refills: 3 | Status: ACTIVE | COMMUNITY
Start: 2025-06-19

## 2025-06-19 RX ORDER — SPIRONOLACTONE 25 MG/1
25 TABLET ORAL
Qty: 90 | Refills: 1 | Status: ACTIVE | COMMUNITY
Start: 2025-06-19 | End: 1900-01-01

## 2025-06-20 LAB
ALBUMIN SERPL ELPH-MCNC: 3.8 G/DL
ALP BLD-CCNC: 132 U/L
ALT SERPL-CCNC: 17 U/L
ANION GAP SERPL CALC-SCNC: 14 MMOL/L
AST SERPL-CCNC: 22 U/L
BASOPHILS # BLD AUTO: 0.02 K/UL
BASOPHILS NFR BLD AUTO: 0.3 %
BILIRUB SERPL-MCNC: 0.8 MG/DL
BUN SERPL-MCNC: 30 MG/DL
CALCIUM SERPL-MCNC: 9.2 MG/DL
CHLORIDE SERPL-SCNC: 108 MMOL/L
CHOLEST SERPL-MCNC: 137 MG/DL
CO2 SERPL-SCNC: 20 MMOL/L
CREAT SERPL-MCNC: 0.95 MG/DL
EGFRCR SERPLBLD CKD-EPI 2021: 78 ML/MIN/1.73M2
EOSINOPHIL # BLD AUTO: 0.3 K/UL
EOSINOPHIL NFR BLD AUTO: 4.6 %
ESTIMATED AVERAGE GLUCOSE: 137 MG/DL
GLUCOSE SERPL-MCNC: 103 MG/DL
HBA1C MFR BLD HPLC: 6.4 %
HCT VFR BLD CALC: 38 %
HDLC SERPL-MCNC: 56 MG/DL
HGB BLD-MCNC: 11.7 G/DL
IMM GRANULOCYTES NFR BLD AUTO: 0.9 %
LDLC SERPL-MCNC: 68 MG/DL
LYMPHOCYTES # BLD AUTO: 1.34 K/UL
LYMPHOCYTES NFR BLD AUTO: 20.3 %
MAN DIFF?: NORMAL
MCHC RBC-ENTMCNC: 30 PG
MCHC RBC-ENTMCNC: 30.8 G/DL
MCV RBC AUTO: 97.4 FL
MONOCYTES # BLD AUTO: 0.78 K/UL
MONOCYTES NFR BLD AUTO: 11.8 %
NEUTROPHILS # BLD AUTO: 4.09 K/UL
NEUTROPHILS NFR BLD AUTO: 62.1 %
NONHDLC SERPL-MCNC: 80 MG/DL
NT-PROBNP SERPL-MCNC: 686 PG/ML
PLATELET # BLD AUTO: 245 K/UL
POTASSIUM SERPL-SCNC: 4.4 MMOL/L
PROT SERPL-MCNC: 6.7 G/DL
RBC # BLD: 3.9 M/UL
RBC # FLD: 15.3 %
SODIUM SERPL-SCNC: 143 MMOL/L
TRIGL SERPL-MCNC: 57 MG/DL
WBC # FLD AUTO: 6.59 K/UL